# Patient Record
Sex: FEMALE | Race: WHITE | NOT HISPANIC OR LATINO | Employment: PART TIME | URBAN - METROPOLITAN AREA
[De-identification: names, ages, dates, MRNs, and addresses within clinical notes are randomized per-mention and may not be internally consistent; named-entity substitution may affect disease eponyms.]

---

## 2023-02-06 ENCOUNTER — APPOINTMENT (OUTPATIENT)
Dept: RADIOLOGY | Facility: CLINIC | Age: 73
End: 2023-02-06

## 2023-02-06 ENCOUNTER — OFFICE VISIT (OUTPATIENT)
Dept: URGENT CARE | Facility: CLINIC | Age: 73
End: 2023-02-06

## 2023-02-06 VITALS
HEART RATE: 77 BPM | HEIGHT: 66 IN | SYSTOLIC BLOOD PRESSURE: 122 MMHG | RESPIRATION RATE: 18 BRPM | TEMPERATURE: 99.2 F | BODY MASS INDEX: 24.91 KG/M2 | OXYGEN SATURATION: 99 % | DIASTOLIC BLOOD PRESSURE: 72 MMHG | WEIGHT: 155 LBS
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
## 2023-02-06 DIAGNOSIS — S61.452A DOG BITE OF LEFT HAND, INITIAL ENCOUNTER: ICD-10-CM
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
## 2023-02-06 DIAGNOSIS — S61.452A DOG BITE OF LEFT HAND, INITIAL ENCOUNTER: Primary | ICD-10-CM

## 2023-02-06 RX ORDER — VALACYCLOVIR HYDROCHLORIDE 1 G/1
TABLET, FILM COATED ORAL
COMMUNITY
Start: 2023-01-28

## 2023-02-06 RX ORDER — NIRMATRELVIR AND RITONAVIR 300-100 MG
KIT ORAL
COMMUNITY
Start: 2023-01-07

## 2023-02-06 RX ORDER — OSELTAMIVIR PHOSPHATE 75 MG/1
CAPSULE ORAL
COMMUNITY
Start: 2022-11-18

## 2023-02-06 RX ORDER — MECLIZINE HCL 12.5 MG/1
12.5 TABLET ORAL EVERY 8 HOURS PRN
COMMUNITY

## 2023-02-06 RX ORDER — GABAPENTIN 300 MG/1
300 CAPSULE ORAL 2 TIMES DAILY
COMMUNITY
Start: 2023-01-22

## 2023-02-06 RX ORDER — CELECOXIB 100 MG/1
CAPSULE ORAL
COMMUNITY
Start: 2023-01-21

## 2023-02-06 RX ORDER — AMLODIPINE BESYLATE 5 MG/1
5 TABLET ORAL DAILY
COMMUNITY
Start: 2023-01-21

## 2023-02-06 RX ORDER — AMOXICILLIN AND CLAVULANATE POTASSIUM 875; 125 MG/1; MG/1
1 TABLET, FILM COATED ORAL EVERY 12 HOURS SCHEDULED
Qty: 14 TABLET | Refills: 0 | Status: SHIPPED | OUTPATIENT
Start: 2023-02-06 | End: 2023-02-13

## 2023-02-06 NOTE — PROGRESS NOTES
[facility]        NAME: Reid Carbajal is a 67 y o  female  : 1950    MRN: [id number]  DATE: 2023  TIME: 4:34 PM    Assessment and Plan   Dog bite of left hand, initial encounter [S61 452A, W54  0XXA]  1  Dog bite of left hand, initial encounter  XR hand 3+ vw left    amoxicillin-clavulanate (AUGMENTIN) 875-125 mg per tablet    TDAP VACCINE GREATER THAN OR EQUAL TO 6YO IM            Patient Instructions     Take antibiotic as directed until completed  Motrin and/or tylenol as needed for pain  Wound care as directed  Follow up with PCP in 3-5 days  Proceed to  ER if symptoms worsen  Chief Complaint     Chief Complaint   Patient presents with   • Dog Bite     Pt reports a dog bite on her left hand that happened yesterday  Reports a neighbor's dog bit her left hand when she was breaking up her dog and the other dog  Neighbor reports dog's vaccines are UTD  Last tetanus unknown  History of Present Illness       60-year-old female presents with dog bite wound to the left hand  Patient reports her dog and neighbors dog got into a fight and was tried to break them up and got bitten on her left hand  Happened yesterday  Not sure of last tetanus  Patient reports multiple puncture wounds to left hand  Today had increased swelling pain and redness to the left hand and wrist   Denies any fevers or chills  No numbness or tingling to the left hand  Injury  The incident occurred 12 to 24 hours ago  The incident occurred at home  The injury mechanism was a cut/puncture wound  Context: Dog bite wound  No protective equipment was used  There is an injury to the left hand  The pain is moderate  It is unlikely that a foreign body is present  Pertinent negatives include no coughing, difficulty breathing, fussiness, loss of consciousness, numbness, seizures, tingling or vomiting  There have been no prior injuries to these areas  Her tetanus status is unknown         Review of Systems Review of Systems   Constitutional: Negative  Respiratory: Negative  Negative for cough  Cardiovascular: Negative  Gastrointestinal: Negative  Negative for vomiting  Musculoskeletal: Negative  Skin: Positive for wound  Neurological: Negative  Negative for tingling, seizures, loss of consciousness and numbness  Current Medications       Current Outpatient Medications:   •  amLODIPine (NORVASC) 5 mg tablet, Take 5 mg by mouth daily, Disp: , Rfl:   •  amoxicillin-clavulanate (AUGMENTIN) 875-125 mg per tablet, Take 1 tablet by mouth every 12 (twelve) hours for 7 days, Disp: 14 tablet, Rfl: 0  •  celecoxib (CeleBREX) 100 mg capsule, TAKE 1 CAPSULE BY MOUTH TWICE DAILY AS NEEDED FOR PAIN, Disp: , Rfl:   •  gabapentin (NEURONTIN) 300 mg capsule, Take 300 mg by mouth 2 (two) times a day, Disp: , Rfl:   •  sertraline (ZOLOFT) 50 mg tablet, Take 50 mg by mouth daily, Disp: , Rfl:   •  meclizine (ANTIVERT) 12 5 MG tablet, Take 12 5 mg by mouth every 8 (eight) hours as needed (Patient not taking: Reported on 2/6/2023), Disp: , Rfl:   •  oseltamivir (TAMIFLU) 75 mg capsule, TAKE 1 CAPSULE BY MOUTH TWICE DAILY FOR 5 DAYS (Patient not taking: Reported on 2/6/2023), Disp: , Rfl:   •  Paxlovid, 300/100, tablet therapy pack, , Disp: , Rfl:   •  valACYclovir (VALTREX) 1,000 mg tablet, TAKE 1 TABLET BY MOUTH THREE TIMES DAILY FOR 7 DAYS - AT ONSET OF SHINGLES RASH (Patient not taking: Reported on 2/6/2023), Disp: , Rfl:     Current Allergies     Allergies as of 02/06/2023   • (No Known Allergies)            The following portions of the patient's history were reviewed and updated as appropriate: allergies, current medications, past family history, past medical history, past social history, past surgical history and problem list      History reviewed  No pertinent past medical history  History reviewed  No pertinent surgical history  History reviewed  No pertinent family history        Medications have been verified  Objective   /72   Pulse 77   Temp 99 2 °F (37 3 °C)   Resp 18   Ht 5' 6" (1 676 m)   Wt 70 3 kg (155 lb)   SpO2 99%   BMI 25 02 kg/m²   No LMP recorded  Physical Exam     Physical Exam  Vitals and nursing note reviewed  Constitutional:       General: She is not in acute distress  Appearance: She is well-developed  HENT:      Head: Normocephalic and atraumatic  Right Ear: External ear normal       Left Ear: External ear normal       Nose: Nose normal       Mouth/Throat:      Pharynx: No oropharyngeal exudate  Eyes:      General:         Right eye: No discharge  Left eye: No discharge  Conjunctiva/sclera: Conjunctivae normal    Pulmonary:      Effort: Pulmonary effort is normal  No respiratory distress  Musculoskeletal:         General: Normal range of motion  Cervical back: Normal range of motion and neck supple  Skin:     General: Skin is warm and dry  Findings: Erythema (Small amount of erythema noted on volar aspect of wrist) and wound (Small puncture wounds noted to dorsal aspect of left hand ) present  Neurological:      Mental Status: She is alert and oriented to person, place, and time  X-rays reviewed  No fractures or abnormalities noted

## 2023-02-06 NOTE — PATIENT INSTRUCTIONS
Take antibiotic as directed until completed  Motrin and/or tylenol as needed for pain  Wound care as directed  Follow up with PCP in 3-5 days  Proceed to  ER if symptoms worsen  Animal Bite   AMBULATORY CARE:   Animal bite  injuries range from shallow cuts to deep, life-threatening wounds  Animal bites occur more often on the hands, arms, legs, and face  Bites from dogs and cats are the most common injuries  Common symptoms include the following:   Cut or punctured skin     Skin torn from your body    Swollen or bruised skin, even if the skin is not broken    Seek care immediately if:   You have a fever  Your wound is red, swollen, and draining pus  You see red streaks on the skin around the wound  You can no longer move the bitten area  Your heartbeat and breathing are much faster than usual     You feel dizzy and confused  Contact your healthcare provider if:   Your pain does not get better, even after you take pain medicine  You have nightmares or flashbacks about the animal bite  You have questions or concerns about your condition or care  Treatment for an animal bite  may include any of the following:  Irrigation and debridement  may be needed to clean out your wound  Dead, damaged, or infected tissue may be cut away to help your wound heal     Medicines:      Antibiotics  prevent or treat a bacterial infection  Prescription pain medicine  may be given  Ask how to take this medicine safely  A tetanus vaccine  may be needed to prevent tetanus  Tetanus is a life-threatening bacterial infection that affects the nerves and muscles  The bacteria can be spread through animal bites  A rabies vaccine  may be needed to prevent rabies  Rabies is a life-threatening viral infection  The virus can be spread through animal bites  Stitches  may be needed if your wound is large and not infected  Surgery  may be needed to repair deep injuries or severe wounds      Manage your symptoms:   Apply antibiotic ointment as directed  This helps prevent infection in minor skin wounds  Antibiotic ointment is available without a prescription  Keep the wound clean and covered  Wash the wound every day with soap and water or germ-killing cleanser  Ask what kinds of bandages to use  Apply ice to your wound  Ice helps prevent tissue damage and decreases swelling and pain  Use an ice pack, or put crushed ice in a plastic bag  Cover it with a towel  Apply ice on your wound for 15 to 20 minutes every hour or as directed  Elevate your wound  Raise your wound above the level of your heart as often as you can  This will help decrease swelling and pain  Prop your wound on pillows or blankets to keep it elevated comfortably  Prevent another animal bite:   Learn to recognize the signs of a scared pet  Avoid quick, sudden movements  Do not step between animals that are fighting  Do not leave a pet alone with a young child  Do not disturb an animal while it eats, sleeps, or cares for its young  Do not approach an animal you do not know, especially one that is tied up or caged  Stay away from animals that seem sick or act strangely  Do not feed or capture wild animals  Follow up with your doctor as directed:  Write down your questions so you remember to ask them during your visits  © [employer] 2022 Information is for End User's use only and may not be sold, redistributed or otherwise used for commercial purposes  All illustrations and images included in CareNotes® are the copyrighted property of A D A M [employer] Inc  or Parth Rojas   The above information is an  only  It is not intended as medical advice for individual conditions or treatments  Talk to your doctor, nurse or pharmacist before following any medical regimen to see if it is safe and effective for you

## 2023-03-04 ENCOUNTER — OFFICE VISIT (OUTPATIENT)
Dept: URGENT CARE | Facility: CLINIC | Age: 73
End: 2023-03-04

## 2023-03-04 ENCOUNTER — APPOINTMENT (OUTPATIENT)
Dept: RADIOLOGY | Facility: CLINIC | Age: 73
End: 2023-03-04

## 2023-03-04 VITALS
BODY MASS INDEX: 24.91 KG/M2 | SYSTOLIC BLOOD PRESSURE: 156 MMHG | DIASTOLIC BLOOD PRESSURE: 74 MMHG | OXYGEN SATURATION: 97 % | HEIGHT: 66 IN | HEART RATE: 73 BPM | WEIGHT: 155 LBS | TEMPERATURE: 98.8 F | RESPIRATION RATE: 20 BRPM

## 2023-03-04 DIAGNOSIS — S93.491A SPRAIN OF POSTERIOR TALOFIBULAR LIGAMENT OF RIGHT ANKLE, INITIAL ENCOUNTER: ICD-10-CM

## 2023-03-04 DIAGNOSIS — S93.491A SPRAIN OF POSTERIOR TALOFIBULAR LIGAMENT OF RIGHT ANKLE, INITIAL ENCOUNTER: Primary | ICD-10-CM

## 2023-03-04 DIAGNOSIS — H61.21 IMPACTED CERUMEN, RIGHT EAR: ICD-10-CM

## 2023-03-04 NOTE — PROGRESS NOTES
[facility]        NAME: Keyla Altman is a 67 y o  female  : 1950    MRN: [id number]  DATE: 2023  TIME: 1:48 PM    Assessment and Orders   Sprain of posterior talofibular ligament of right ankle, initial encounter [U94 626D]  1  Sprain of posterior talofibular ligament of right ankle, initial encounter  XR ankle 3+ vw right      2  Impacted cerumen, right ear  Ear cerumen removal            Plan and Discussion      Symptoms and exam consistent with ankle sprain  Xray negative for acute osseous abnormalities  Patient fitted with ankle brace  Encouraged rest, ice and NSAIDs  Impacted cerumen of right ear cleared with irrigation  Discussed ED precautions including (but not limited to)  • Difficultly breathing or shortness of breath  • Chest pain  • Acutely worsening symptoms  Risks and benefits discussed  Patient understands and agrees with the plan  Follow up with PCP  Chief Complaint     Chief Complaint   Patient presents with   • Cough     Patient c/o decreased hearing in right ear thinks it may be full of wax and reports to have fallen last Friday tripped over sidewalk and c/o right ankle pain  History of Present Illness       Ankle Pain   The incident occurred 12 to 24 hours ago  The incident occurred at home  The injury mechanism was an inversion injury  The pain is present in the right ankle  The quality of the pain is described as aching  The pain has been constant since onset  Pertinent negatives include no inability to bear weight, loss of motion, loss of sensation, muscle weakness, numbness or tingling  She reports no foreign bodies present  The symptoms are aggravated by weight bearing, palpation and movement  Review of Systems   Review of Systems   Neurological: Negative for tingling and numbness           Current Medications       Current Outpatient Medications:   •  amLODIPine (NORVASC) 5 mg tablet, Take 5 mg by mouth daily, Disp: , Rfl:   •  celecoxib (CeleBREX) 100 mg capsule, , Disp: , Rfl:   •  gabapentin (NEURONTIN) 300 mg capsule, Take 300 mg by mouth 2 (two) times a day, Disp: , Rfl:   •  sertraline (ZOLOFT) 50 mg tablet, Take 50 mg by mouth daily, Disp: , Rfl:   •  meclizine (ANTIVERT) 12 5 MG tablet, Take 12 5 mg by mouth every 8 (eight) hours as needed (Patient not taking: Reported on 2/6/2023), Disp: , Rfl:   •  oseltamivir (TAMIFLU) 75 mg capsule, TAKE 1 CAPSULE BY MOUTH TWICE DAILY FOR 5 DAYS (Patient not taking: Reported on 2/6/2023), Disp: , Rfl:   •  Paxlovid, 300/100, tablet therapy pack, , Disp: , Rfl:   •  valACYclovir (VALTREX) 1,000 mg tablet, TAKE 1 TABLET BY MOUTH THREE TIMES DAILY FOR 7 DAYS - AT ONSET OF SHINGLES RASH (Patient not taking: Reported on 2/6/2023), Disp: , Rfl:     Current Allergies     Allergies as of 03/04/2023   • (No Known Allergies)            The following portions of the patient's history were reviewed and updated as appropriate: allergies, current medications, past family history, past medical history, past social history, past surgical history and problem list      History reviewed  No pertinent past medical history  No past surgical history on file  No family history on file  Medications have been verified  Objective   /74   Pulse 73   Temp 98 8 °F (37 1 °C) (Tympanic)   Resp 20   Ht 5' 6" (1 676 m)   Wt 70 3 kg (155 lb)   SpO2 97%   BMI 25 02 kg/m²   No LMP recorded  Physical Exam     Physical Exam  HENT:      Right Ear: There is impacted cerumen  Left Ear: Ear canal and external ear normal    Pulmonary:      Effort: No respiratory distress  Musculoskeletal:      Right ankle: No swelling, deformity, ecchymosis or lacerations  Tenderness present over the posterior TF ligament  Normal range of motion  Anterior drawer test negative  Normal pulse  Neurological:      General: No focal deficit present        Mental Status: She is alert and oriented to person, place, and time  Psychiatric:         Mood and Affect: Mood normal          Behavior: Behavior normal                Severo Alley DO     Ear cerumen removal    Date/Time: 3/4/2023 1:45 PM  Performed by: Julita Lucio DO  Authorized by: Julita Lucio DO   Universal Protocol:  Patient understanding: patient states understanding of the procedure being performed  Patient identity confirmed: verbally with patient      Patient location:  Clinic  Procedure details:     Location:  R ear    Procedure type: irrigation only      Approach:  External  Post-procedure details:     Complication:  None    Hearing quality:  Improved    Patient tolerance of procedure:   Tolerated well, no immediate complications

## 2023-05-25 ENCOUNTER — OFFICE VISIT (OUTPATIENT)
Dept: URGENT CARE | Facility: CLINIC | Age: 73
End: 2023-05-25

## 2023-05-25 ENCOUNTER — TELEPHONE (OUTPATIENT)
Dept: OBGYN CLINIC | Facility: HOSPITAL | Age: 73
End: 2023-05-25

## 2023-05-25 VITALS
OXYGEN SATURATION: 98 % | DIASTOLIC BLOOD PRESSURE: 64 MMHG | HEART RATE: 62 BPM | RESPIRATION RATE: 20 BRPM | SYSTOLIC BLOOD PRESSURE: 136 MMHG | TEMPERATURE: 98.3 F

## 2023-05-25 DIAGNOSIS — M25.532 LEFT WRIST PAIN: Primary | ICD-10-CM

## 2023-05-25 RX ORDER — METHYLPREDNISOLONE 4 MG/1
TABLET ORAL
Qty: 21 EACH | Refills: 0 | Status: ON HOLD | OUTPATIENT
Start: 2023-05-25

## 2023-05-25 NOTE — TELEPHONE ENCOUNTER
Patient is being referred to a orthopedics. Please schedule accordingly.     [address] [place] [address]   [phone]

## 2023-06-04 ENCOUNTER — APPOINTMENT (EMERGENCY)
Dept: RADIOLOGY | Facility: HOSPITAL | Age: 73
End: 2023-06-04
Payer: COMMERCIAL

## 2023-06-04 ENCOUNTER — APPOINTMENT (EMERGENCY)
Dept: CT IMAGING | Facility: HOSPITAL | Age: 73
End: 2023-06-04
Payer: COMMERCIAL

## 2023-06-04 ENCOUNTER — OFFICE VISIT (OUTPATIENT)
Dept: URGENT CARE | Facility: CLINIC | Age: 73
End: 2023-06-04
Payer: COMMERCIAL

## 2023-06-04 ENCOUNTER — HOSPITAL ENCOUNTER (EMERGENCY)
Facility: HOSPITAL | Age: 73
End: 2023-06-04
Attending: EMERGENCY MEDICINE
Payer: COMMERCIAL

## 2023-06-04 ENCOUNTER — HOSPITAL ENCOUNTER (INPATIENT)
Facility: HOSPITAL | Age: 73
LOS: 4 days | Discharge: HOME | DRG: 085 | End: 2023-06-08
Attending: SURGERY | Admitting: SURGERY
Payer: COMMERCIAL

## 2023-06-04 VITALS
DIASTOLIC BLOOD PRESSURE: 138 MMHG | RESPIRATION RATE: 20 BRPM | SYSTOLIC BLOOD PRESSURE: 190 MMHG | OXYGEN SATURATION: 98 % | TEMPERATURE: 97.6 F | HEART RATE: 74 BPM

## 2023-06-04 DIAGNOSIS — I10 ACCELERATED HYPERTENSION: ICD-10-CM

## 2023-06-04 DIAGNOSIS — S00.83XA FOREHEAD CONTUSION, INITIAL ENCOUNTER: ICD-10-CM

## 2023-06-04 DIAGNOSIS — S62.111A TRIQUETRAL CHIP FRACTURE, RIGHT, CLOSED, INITIAL ENCOUNTER: ICD-10-CM

## 2023-06-04 DIAGNOSIS — S06.5XAA ACUTE SUBDURAL HEMATOMA (HCC): Primary | ICD-10-CM
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
## 2023-06-04 DIAGNOSIS — S06.5XAA SUBDURAL HEMATOMA (HCC): Primary | ICD-10-CM

## 2023-06-04 DIAGNOSIS — S00.83XA TRAUMATIC HEMATOMA OF FOREHEAD, INITIAL ENCOUNTER: ICD-10-CM

## 2023-06-04 PROBLEM — F39 MOOD DISORDER (HCC): Status: ACTIVE | Noted: 2023-06-04

## 2023-06-04 PROBLEM — E78.5 HYPERLIPIDEMIA: Status: ACTIVE | Noted: 2023-06-04

## 2023-06-04 LAB
ABO GROUP BLD: NORMAL
ABO GROUP BLD: NORMAL
ANION GAP SERPL CALCULATED.3IONS-SCNC: 5 MMOL/L (ref 4–13)
BASOPHILS # BLD AUTO: 0.07 THOUSANDS/ÂΜL (ref 0–0.1)
BASOPHILS NFR BLD AUTO: 1 % (ref 0–1)
BLD GP AB SCN SERPL QL: NEGATIVE
BUN SERPL-MCNC: 18 MG/DL (ref 5–25)
CALCIUM SERPL-MCNC: 10 MG/DL (ref 8.4–10.2)
CHLORIDE SERPL-SCNC: 106 MMOL/L (ref 96–108)
CO2 SERPL-SCNC: 30 MMOL/L (ref 21–32)
CREAT SERPL-MCNC: 0.82 MG/DL (ref 0.6–1.3)
EOSINOPHIL # BLD AUTO: 0.1 THOUSAND/ÂΜL (ref 0–0.61)
EOSINOPHIL NFR BLD AUTO: 1 % (ref 0–6)
ERYTHROCYTE [DISTWIDTH] IN BLOOD BY AUTOMATED COUNT: 13.3 % (ref 11.6–15.1)
GFR SERPL CREATININE-BSD FRML MDRD: 71 ML/MIN/1.73SQ M
GLUCOSE SERPL-MCNC: 97 MG/DL (ref 65–140)
HCT VFR BLD AUTO: 42 % (ref 34.8–46.1)
HGB BLD-MCNC: 13.7 G/DL (ref 11.5–15.4)
IMM GRANULOCYTES # BLD AUTO: 0.07 THOUSAND/UL (ref 0–0.2)
IMM GRANULOCYTES NFR BLD AUTO: 1 % (ref 0–2)
LYMPHOCYTES # BLD AUTO: 1.55 THOUSANDS/ÂΜL (ref 0.6–4.47)
LYMPHOCYTES NFR BLD AUTO: 17 % (ref 14–44)
MCH RBC QN AUTO: 29.5 PG (ref 26.8–34.3)
MCHC RBC AUTO-ENTMCNC: 32.6 G/DL (ref 31.4–37.4)
MCV RBC AUTO: 91 FL (ref 82–98)
MONOCYTES # BLD AUTO: 0.81 THOUSAND/ÂΜL (ref 0.17–1.22)
MONOCYTES NFR BLD AUTO: 9 % (ref 4–12)
NEUTROPHILS # BLD AUTO: 6.43 THOUSANDS/ÂΜL (ref 1.85–7.62)
NEUTS SEG NFR BLD AUTO: 71 % (ref 43–75)
NRBC BLD AUTO-RTO: 0 /100 WBCS
PLATELET # BLD AUTO: 221 THOUSANDS/UL (ref 149–390)
PMV BLD AUTO: 10 FL (ref 8.9–12.7)
POTASSIUM SERPL-SCNC: 4.4 MMOL/L (ref 3.5–5.3)
RBC # BLD AUTO: 4.64 MILLION/UL (ref 3.81–5.12)
RH BLD: POSITIVE
RH BLD: POSITIVE
SODIUM SERPL-SCNC: 141 MMOL/L (ref 135–147)
SPECIMEN EXPIRATION DATE: NORMAL
WBC # BLD AUTO: 9.03 THOUSAND/UL (ref 4.31–10.16)

## 2023-06-04 PROCEDURE — 86850 RBC ANTIBODY SCREEN: CPT | Performed by: EMERGENCY MEDICINE

## 2023-06-04 PROCEDURE — 99284 EMERGENCY DEPT VISIT MOD MDM: CPT

## 2023-06-04 PROCEDURE — NC001 PR NO CHARGE: Performed by: SURGERY

## 2023-06-04 PROCEDURE — 72125 CT NECK SPINE W/O DYE: CPT

## 2023-06-04 PROCEDURE — 85025 COMPLETE CBC W/AUTO DIFF WBC: CPT | Performed by: EMERGENCY MEDICINE

## 2023-06-04 PROCEDURE — 73030 X-RAY EXAM OF SHOULDER: CPT

## 2023-06-04 PROCEDURE — 73564 X-RAY EXAM KNEE 4 OR MORE: CPT

## 2023-06-04 PROCEDURE — 93005 ELECTROCARDIOGRAM TRACING: CPT

## 2023-06-04 PROCEDURE — 80048 BASIC METABOLIC PNL TOTAL CA: CPT | Performed by: EMERGENCY MEDICINE

## 2023-06-04 PROCEDURE — 96374 THER/PROPH/DIAG INJ IV PUSH: CPT

## 2023-06-04 PROCEDURE — 99223 1ST HOSP IP/OBS HIGH 75: CPT | Performed by: SURGERY

## 2023-06-04 PROCEDURE — 36415 COLL VENOUS BLD VENIPUNCTURE: CPT | Performed by: EMERGENCY MEDICINE

## 2023-06-04 PROCEDURE — 86901 BLOOD TYPING SEROLOGIC RH(D): CPT | Performed by: EMERGENCY MEDICINE

## 2023-06-04 PROCEDURE — 70450 CT HEAD/BRAIN W/O DYE: CPT

## 2023-06-04 PROCEDURE — 86900 BLOOD TYPING SEROLOGIC ABO: CPT | Performed by: EMERGENCY MEDICINE

## 2023-06-04 PROCEDURE — 73110 X-RAY EXAM OF WRIST: CPT

## 2023-06-04 RX ORDER — OXYCODONE HYDROCHLORIDE 5 MG/1
5 TABLET ORAL EVERY 4 HOURS PRN
Status: DISCONTINUED | OUTPATIENT
Start: 2023-06-04 | End: 2023-06-08 | Stop reason: HOSPADM

## 2023-06-04 RX ORDER — GINSENG 100 MG
1 CAPSULE ORAL ONCE
Status: COMPLETED | OUTPATIENT
Start: 2023-06-04 | End: 2023-06-04

## 2023-06-04 RX ORDER — LABETALOL HYDROCHLORIDE 5 MG/ML
10 INJECTION, SOLUTION INTRAVENOUS ONCE
Status: COMPLETED | OUTPATIENT
Start: 2023-06-04 | End: 2023-06-04

## 2023-06-04 RX ORDER — ACETAMINOPHEN 325 MG/1
975 TABLET ORAL EVERY 6 HOURS SCHEDULED
Status: DISCONTINUED | OUTPATIENT
Start: 2023-06-04 | End: 2023-06-08 | Stop reason: HOSPADM

## 2023-06-04 RX ORDER — CHLORHEXIDINE GLUCONATE 0.12 MG/ML
15 RINSE ORAL EVERY 12 HOURS SCHEDULED
Status: DISCONTINUED | OUTPATIENT
Start: 2023-06-04 | End: 2023-06-08 | Stop reason: HOSPADM

## 2023-06-04 RX ORDER — LABETALOL HYDROCHLORIDE 5 MG/ML
20 INJECTION, SOLUTION INTRAVENOUS EVERY 4 HOURS PRN
Status: DISCONTINUED | OUTPATIENT
Start: 2023-06-04 | End: 2023-06-04

## 2023-06-04 RX ORDER — LEVETIRACETAM 500 MG/1
500 TABLET ORAL EVERY 12 HOURS SCHEDULED
Status: DISCONTINUED | OUTPATIENT
Start: 2023-06-04 | End: 2023-06-08 | Stop reason: HOSPADM

## 2023-06-04 RX ORDER — LABETALOL HYDROCHLORIDE 5 MG/ML
20 INJECTION, SOLUTION INTRAVENOUS EVERY 4 HOURS PRN
Status: DISCONTINUED | OUTPATIENT
Start: 2023-06-04 | End: 2023-06-08 | Stop reason: HOSPADM

## 2023-06-04 RX ORDER — GABAPENTIN 300 MG/1
300 CAPSULE ORAL 2 TIMES DAILY
Status: DISCONTINUED | OUTPATIENT
Start: 2023-06-04 | End: 2023-06-08 | Stop reason: HOSPADM

## 2023-06-04 RX ORDER — AMLODIPINE BESYLATE 5 MG/1
5 TABLET ORAL DAILY
Status: DISCONTINUED | OUTPATIENT
Start: 2023-06-04 | End: 2023-06-08 | Stop reason: HOSPADM

## 2023-06-04 RX ADMIN — LABETALOL HYDROCHLORIDE 10 MG: 5 INJECTION, SOLUTION INTRAVENOUS at 11:29

## 2023-06-04 RX ADMIN — ACETAMINOPHEN 975 MG: 325 TABLET, FILM COATED ORAL at 19:52

## 2023-06-04 RX ADMIN — BACITRACIN 1 SMALL APPLICATION: 500 OINTMENT TOPICAL at 11:46

## 2023-06-04 RX ADMIN — ACETAMINOPHEN 975 MG: 325 TABLET, FILM COATED ORAL at 23:32

## 2023-06-04 RX ADMIN — LEVETIRACETAM 500 MG: 500 TABLET, FILM COATED ORAL at 14:55

## 2023-06-04 RX ADMIN — CHLORHEXIDINE GLUCONATE 15 ML: 1.2 SOLUTION ORAL at 22:11

## 2023-06-04 RX ADMIN — AMLODIPINE BESYLATE 5 MG: 5 TABLET ORAL at 13:37

## 2023-06-04 RX ADMIN — ACETAMINOPHEN 975 MG: 325 TABLET, FILM COATED ORAL at 13:37

## 2023-06-04 RX ADMIN — GABAPENTIN 300 MG: 300 CAPSULE ORAL at 18:32

## 2023-06-04 NOTE — RESULT ENCOUNTER NOTE
Currently inpatient at [facility], results available to inpatient treatment team

## 2023-06-04 NOTE — ASSESSMENT & PLAN NOTE
- secondary to mechanical fall  - 6/4 CT head- Acute left subdural hematoma measuring up to 9 mm  3 mm left to right shift    - patient denies any antiplatelet or anticoagulant use  - 500 mg keppra BID for 7 days  - neurosurgery consult -- signed off; outpatient follow-up in two weeks  - GCS 15 with no focal deficits  - PT/OT and CM consult for disposition planning

## 2023-06-04 NOTE — TRAUMA DOCUMENTATION
"Patient reports tripping on sidewalk while walking down this morning [id number]=, patient states she needed assistance getting up but was able to drive herself  Reports \"pain all over\"  Denies LOC     "

## 2023-06-04 NOTE — EMTALA/ACUTE CARE TRANSFER
[facility] EMERGENCY DEPARTMENT  [address]  [facility] [address]  Dept: [phone]      EMTALA TRANSFER CONSENT    NAME Samantha Crain                                         1950                              MRN [id number]    I have been informed of my rights regarding examination, treatment, and transfer   by Dr Paula Medina DO    Benefits: Specialized equipment and/or services available at the receiving facility (Include comment)________________________ (neurosurgery)    Risks: Potential for delay in receiving treatment, Potential deterioration of medical condition, Loss of IV, Increased discomfort during transfer, Possible worsening of condition or death during transfer      Consent for Transfer:  I acknowledge that my medical condition has been evaluated and explained to me by the emergency department physician or other qualified medical person and/or my attending physician, who has recommended that I be transferred to the service of  Accepting Physician: Clotilde Daily at [address] Name, [address] : [facility]  The above potential benefits of such transfer, the potential risks associated with such transfer, and the probable risks of not being transferred have been explained to me, and I fully understand them  The doctor has explained that, in my case, the benefits of transfer outweigh the risks  I agree to be transferred  I authorize the performance of emergency medical procedures and treatments upon me in both transit and upon arrival at the receiving facility  Additionally, I authorize the release of any and all medical records to the receiving facility and request they be transported with me, if possible  I understand that the safest mode of transportation during a medical emergency is an ambulance and that the Hospital advocates the use of this mode of transport   Risks of traveling to the receiving facility by car, including absence of medical control, life sustaining equipment, such as oxygen, and medical personnel has been explained to me and I fully understand them  (CHARITY CORRECT BOX BELOW)  [  ]  I consent to the stated transfer and to be transported by ambulance/helicopter  [  ]  I consent to the stated transfer, but refuse transportation by ambulance and accept full responsibility for my transportation by car  I understand the risks of non-ambulance transfers and I exonerate the Hospital and its staff from any deterioration in my condition that results from this refusal     X___________________________________________    DATE  23  TIME________  Signature of patient or legally responsible individual signing on patient behalf           RELATIONSHIP TO PATIENT_________________________          Provider Certification    NAME Donavan Isaacs                                        [facility] 1950                              MRN [id number]    A medical screening exam was performed on the above named patient  Based on the examination:    Condition Necessitating Transfer The primary encounter diagnosis was Acute subdural hematoma ([address] )  Diagnoses of Forehead contusion, initial encounter and Fall from standing, initial encounter were also pertinent to this visit      Patient Condition: The patient has been stabilized such that within reasonable medical probability, no material deterioration of the patient condition or the condition of the unborn child(andria) is likely to result from the transfer    Reason for Transfer: Level of Care needed not available at this facility    Transfer Requirements: Facility [facility]   · Space available and qualified personnel available for treatment as acknowledged by Marcelino Gore  · Agreed to accept transfer and to provide appropriate medical treatment as acknowledged by       Alesia Reece  · Appropriate medical records of the examination and treatment of the patient are provided at the time of transfer   [address] _______  · Transfer will be performed by qualified personnel from [facility]  and appropriate transfer equipment as required, including the use of necessary and appropriate life support measures  Provider Certification: I have examined the patient and explained the following risks and benefits of being transferred/refusing transfer to the patient/family:  General risk, such as traffic hazards, adverse weather conditions, rough terrain or turbulence, possible failure of equipment (including vehicle or aircraft), or consequences of actions of persons outside the control of the transport personnel, Unanticipated needs of medical equipment and personnel during transport, Risk of worsening condition      Based on these reasonable risks and benefits to the patient and/or the unborn child(andria), and based upon the information available at the time of the patient’s examination, I certify that the medical benefits reasonably to be expected from the provision of appropriate medical treatments at another medical facility outweigh the increasing risks, if any, to the individual’s medical condition, and in the case of labor to the unborn child, from effecting the transfer      X____________________________________________ DATE 06/04/23        TIME_______      ORIGINAL - SEND TO MEDICAL RECORDS   COPY - SEND WITH PATIENT DURING TRANSFER

## 2023-06-04 NOTE — ED PROVIDER NOTES
Emergency Department Trauma Note  Carina Saeed 67 y o  female MRN: [id number]  Unit/Bed#: ED 08/ED 08 Encounter: [id number]      Trauma Alert: Trauma Acuity: Trauma Evaluation  Model of Arrival: Mode of Arrival: Direct from scene via    Trauma Team: Current Providers  Attending Provider: Maryse Castaeñda DO  Registered Nurse: Taylor Abraham, RN  Registered Nurse: Hernando Moody RN  Consultants:     None      History of Present Illness     Chief Complaint: No chief complaint on file  HPI:  Carina Saeed is a 67 y o  female who presents with forehead contusion and abrasion  Mechanism:Details of Incident: pt reports tripping while walking on sidewalk falling forward hitting head on sidewalk Injury Date: 06/04/23 Injury Time: 0730 Injury Occurence Location - [address] [place] [address] Way: [facility]    67-year-old female with history of hypertension, lipidemia, TIA, anxiety and depression states that she tripped on uneven sidewalk around 8:00 this morning and fell forward  She struck her left forehead  Also complains of mild pain of left shoulder and pain of right wrist and left knee  Denies loss of consciousness but she is unsure  Has mild headache, mild neck discomfort but no focal neurologic changes  No other complaints  Tetanus is up-to-date  Review of Systems   Constitutional: Negative for fever  Eyes: Negative for visual disturbance  Respiratory: Negative for shortness of breath  Cardiovascular: Negative for chest pain, palpitations and leg swelling  Gastrointestinal: Negative for abdominal pain  Musculoskeletal: Positive for arthralgias and myalgias  Neurological: Positive for headaches  Negative for dizziness, seizures, syncope, light-headedness and numbness         Historical Information     Immunizations:   Immunization History   Administered Date(s) Administered   • Tdap 02/06/2023       Past Medical History:   Diagnosis Date   • Hypertension        Family History   Problem Relation Age of Onset   • Heart disease Mother      Past Surgical History:   Procedure Laterality Date   • APPENDECTOMY     • HYSTERECTOMY       Social History     Tobacco Use   • Smoking status: Never   • Smokeless tobacco: Never   Vaping Use   • Vaping Use: Never used   Substance Use Topics   • Alcohol use: Yes   • Drug use: Never     E-Cigarette/Vaping   • E-Cigarette Use Never User      E-Cigarette/Vaping Substances       Family History: non-contributory    Meds/Allergies   Prior to Admission Medications   Prescriptions Last Dose Informant Patient Reported? Taking?    Paxlovid, 300/100, tablet therapy pack   Yes No   Patient not taking: Reported on 2/6/2023   amLODIPine (NORVASC) 5 mg tablet   Yes No   Sig: Take 5 mg by mouth daily   celecoxib (CeleBREX) 100 mg capsule   Yes No   gabapentin (NEURONTIN) 300 mg capsule   Yes No   Sig: Take 300 mg by mouth 2 (two) times a day   methylPREDNISolone 4 MG tablet therapy pack Not Taking  No No   Sig: Use as directed on package   Patient not taking: Reported on 6/4/2023   sertraline (ZOLOFT) 50 mg tablet   Yes No   Sig: Take 50 mg by mouth daily   Patient not taking: Reported on 5/25/2023   valACYclovir (VALTREX) 1,000 mg tablet   Yes No   Sig: TAKE 1 TABLET BY MOUTH THREE TIMES DAILY FOR 7 DAYS - AT ONSET OF SHINGLES RASH   Patient not taking: Reported on 2/6/2023      Facility-Administered Medications: None       Allergies   Allergen Reactions   • Dust Mite Extract Other (See Comments)     And dust mites       PHYSICAL EXAM    PE limited by: nothing    Objective   Vitals:   First set: Temperature: 97 6 °F (36 4 °C) (06/04/23 0959)  Pulse: 73 (06/04/23 1002)  Respirations: 16 (06/04/23 1002)  Blood Pressure: (!) 206/61 (06/04/23 1000)  SpO2: 97 % (06/04/23 1002)    Primary Survey:   (A) Airway: normal vocalizations  (B) Breathing: symmetric air intake and chest rise  (C) Circulation: Pulses:   normal  (D) Disabliity:  GCS Total:  15  (E) Expose:  Completed    Breath sounds equal  No crepitus or chest wall tenderness with compression over the chest  No pain or instability with compression over the pelvis  No bedside imaging required  Patient is stable to proceed to CT scan  Secondary Survey: (Click on Physical Exam tab above)  Physical Exam  Vitals and nursing note reviewed  Constitutional:       General: She is not in acute distress  Appearance: She is well-developed and normal weight  She is not ill-appearing or diaphoretic  HENT:      Head: Normocephalic  Comments: Swelling, tenderness, abrasion left frontal/supraorbital  No crepitance  Right Ear: External ear normal       Left Ear: External ear normal       Nose: Nose normal       Mouth/Throat:      Mouth: Mucous membranes are moist       Pharynx: Oropharynx is clear  Eyes:      General: No scleral icterus  Conjunctiva/sclera: Conjunctivae normal       Pupils: Pupils are equal, round, and reactive to light  Cardiovascular:      Rate and Rhythm: Normal rate and regular rhythm  Pulses: Normal pulses  Heart sounds: Normal heart sounds  No murmur heard  Pulmonary:      Effort: Pulmonary effort is normal       Breath sounds: Normal breath sounds  Chest:      Chest wall: No tenderness  Abdominal:      General: Bowel sounds are normal       Palpations: Abdomen is soft  There is no mass  Tenderness: There is no abdominal tenderness  Musculoskeletal:         General: Tenderness ( left forehead, left shoulder, left kneel right wrist) and signs of injury present  Normal range of motion  Cervical back: Normal range of motion and neck supple  Tenderness ( mild, upper midline without step off) present  Skin:     General: Skin is warm and dry  Capillary Refill: Capillary refill takes less than 2 seconds  Findings: Bruising and lesion ( superficial abrasion left forehead approximately 1 cm x 0 5 cm) present  No rash  Neurological:      General: No focal deficit present        Mental Status: She is alert and oriented to person, place, and time  Mental status is at baseline  Cranial Nerves: No cranial nerve deficit  Sensory: No sensory deficit  Motor: No weakness  Coordination: Coordination normal       Gait: Gait normal       Deep Tendon Reflexes: Reflexes are normal and symmetric  Reflexes normal    Psychiatric:         Mood and Affect: Mood normal          Behavior: Behavior normal          Cervical spine cleared by clinical criteria?  No (imaging required)      Invasive Devices     None                 Lab Results:   Results Reviewed     Procedure Component Value Units Date/Time    Basic metabolic panel [[id number]] Collected: 06/04/23 1041    Lab Status: Final result Specimen: Blood from Arm, Left Updated: 06/04/23 1104     Sodium 141 mmol/L      Potassium 4 4 mmol/L      Chloride 106 mmol/L      CO2 30 mmol/L      ANION GAP 5 mmol/L      BUN 18 mg/dL      Creatinine 0 82 mg/dL      Glucose 97 mg/dL      Calcium 10 0 mg/dL      eGFR 71 ml/min/1 73sq m     Narrative:      [place] guidelines for Chronic Kidney Disease (CKD):   •  Stage 1 with normal or high GFR (GFR > 90 mL/min/1 73 square meters)  •  Stage 2 Mild CKD (GFR = 60-89 mL/min/1 73 square meters)  •  Stage 3A Moderate CKD (GFR = 45-59 mL/min/1 73 square meters)  •  Stage 3B Moderate CKD (GFR = 30-44 mL/min/1 73 square meters)  •  Stage 4 Severe CKD (GFR = 15-29 mL/min/1 73 square meters)  •  Stage 5 End Stage CKD (GFR <15 mL/min/1 73 square meters)  Note: GFR calculation is accurate only with a steady state creatinine    CBC and differential [[id number]] Collected: 06/04/23 1041    Lab Status: Final result Specimen: Blood from Arm, Left Updated: 06/04/23 1048     WBC 9 03 Thousand/uL      RBC 4 64 Million/uL      Hemoglobin 13 7 g/dL      Hematocrit 42 0 %      MCV 91 fL      MCH 29 5 pg      MCHC 32 6 g/dL      RDW 13 3 %      MPV 10 0 fL      Platelets 517 Thousands/uL      nRBC 0 /100 "WBCs      Neutrophils Relative 71 %      Immat GRANS % 1 %      Lymphocytes Relative 17 %      Monocytes Relative 9 %      Eosinophils Relative 1 %      Basophils Relative 1 %      Neutrophils Absolute 6 43 Thousands/µL      Immature Grans Absolute 0 07 Thousand/uL      Lymphocytes Absolute 1 55 Thousands/µL      Monocytes Absolute 0 81 Thousand/µL      Eosinophils Absolute 0 10 Thousand/µL      Basophils Absolute 0 07 Thousands/µL                  Imaging Studies:   Direct to CT: No  XR wrist 3+ views RIGHT   ED Interpretation by Dee Dee Uribe DO (06/04 1135)   NO acute abnormality  OA      Final Result by Satish Lima MD (06/04 1285)      Bony fragment along the dorsal of the carpus on the lateral radiograph is suspicious for subtle triquetral fracture  Alternatively, in this patient with chondrocalcinosis, this could simply represent a fragment of calcified cartilage  Orthopedic clinical    evaluation at follow-up will determine the need for follow-up cross-sectional imaging to distinguish between these 2 possibilities  This examination was marked \"immediate notification\" in Epic in order to begin the standard process by which the radiology reading room liaison alerts the referring practitioner  Workstation performed: [id number]         XR shoulder 2+ views LEFT   ED Interpretation by Dee Dee Uribe DO (06/04 1135)   No acute abnormality      Final Result by Satish Lima MD (06/04 4046)      No acute osseous abnormality  Workstation performed: [id number]         XR knee 4+ views left injury   ED Interpretation by Dee Dee Uribe DO (06/04 1135)   No acute abnormality      Final Result by Satish Lima MD (06/04 2746)      No acute osseous abnormality  Degenerative changes and meniscal chondrocalcinosis as described  Workstation performed: [id number]         TRAUMA - CT head wo contrast   Final Result by INDIA Dale MD (06/04 1046)   Acute " left subdural hematoma measuring up to 9 mm  3 mm left to right shift  I personally discussed this study with Robert Figueroa on 10:39 a m  Workstation performed: [id number]         TRAUMA - CT spine cervical wo contrast   Final Result by INDIA Chew MD (06/04 1046)      No cervical spine fracture or traumatic malalignment  Workstation performed: [id number]               Procedures  ECG 12 Lead Documentation Only    Date/Time: 6/4/2023 10:13 AM    Performed by: Karin Mclaughlin DO  Authorized by: Karin Mclaughlin DO    ECG reviewed by me, the ED Provider: yes    Patient location:  ED  Previous ECG:     Previous ECG:  Unavailable    Comparison to cardiac monitor: Yes    Interpretation:     Interpretation: normal               ED Course           Medical Decision Making  69-year-old female on celecoxib and without known bleeding diathesis tripped and fell while walking on the sidewalk today  States she has fallen several times in the past   She struck her forehead and has some discomfort of right wrist, left shoulder and left knee  Does not believe that she passed out, denies neurologic symptoms, back pain, pain to ribs or abdomen etc   Concern for facial contusion, frontal bone and periorbital fracture, intracranial hemorrhage, cervical fracture or sprain, fracture right wrist, fracture left knee  No evidence for seizure, ACS or acute neurologic deficit  Will image head and C-spine  We will do imaging of other tender peripheral joints  CT does reveal subdural hematoma with 3 mm shift  Plain imaging does not reveal new acute osseous abnormalities but patient does have 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  Discussed with trauma surgeon  Labetalol given for elevated blood pressure  Patient stable for transport      Accelerated hypertension: chronic illness or injury with exacerbation, progression, or side effects of treatment  Acute subdural hematoma [facility]): acute illness or injury that poses a threat to life or bodily functions  Fall from standing, initial encounter: acute illness or injury  Forehead contusion, initial encounter: acute illness or injury  Amount and/or Complexity of Data Reviewed  Labs: ordered  Radiology: ordered and independent interpretation performed  ECG/medicine tests: ordered and independent interpretation performed  Decision-making details documented in ED Course  Discussion of management or test interpretation with external provider(s): Discussed with trauma surgeon, Dr Clotilde Daily  He accepts patient transfer to [facility]  OTC drugs  Prescription drug management  Decision regarding hospitalization  Disposition  [facility] Transfer: No  Final diagnoses:   Acute subdural hematoma ([facility] [address] )   Forehead contusion, initial encounter   Fall from standing, initial encounter   Accelerated hypertension     Time reflects when diagnosis was documented in both MDM as applicable and the Disposition within this note     Time User Action Codes Description Comment    6/4/2023 10:53 AM Jaylin Sit Add [S06  5XAA] Acute subdural hematoma ([address] )     6/4/2023 10:53 AM Jaylin Sit Add [S00 83XA] Forehead contusion, initial encounter     6/4/2023 10:53 AM Jaylin Sit Add [L62  XXXA] Fall from standing, initial encounter     6/4/2023 11:41 AM Jaylin Sit Add [I10] Accelerated hypertension       ED Disposition     ED Disposition   Transfer to Another Facility-In Network    Condition   --    Date/Time   Sun Jun 4, 2023 10:52 AM    Comment   Samantha Crain should be transferred out to B             MD Documentation    [place]ai [place] Most Recent Value   Patient Condition The patient has been stabilized such that within reasonable medical probability, no material deterioration of the patient condition or the condition of the unborn child(andria) is likely to result from the transfer   Reason for Transfer Level of Care needed not available at this facility   Benefits of Transfer Specialized equipment and/or services available at the receiving facility (Include comment)________________________  [neurosurgery]   Risks of Transfer Potential for delay in receiving treatment, Potential deterioration of medical condition, Loss of IV, Increased discomfort during transfer, Possible worsening of condition or death during transfer   Accepting Physician [address], [address] [place]    (Name & Tel number) Samy Morris   Transported by [employer] and Unit #) Melissa Lock MD [facility]   Provider Certification General risk, such as traffic hazards, adverse weather conditions, rough terrain or turbulence, possible failure of equipment (including vehicle or aircraft), or consequences of actions of persons outside the control of the transport personnel, Unanticipated needs of medical equipment and personnel during transport, Risk of worsening condition      RN Documentation    Flowsheet Row Most [facility] Name, [address]  [place]    (Name & Tel number) Samy Morris   Transported by [employer] and Unit #) RAJANI      Follow-up Information    None       Discharge Medication List as of 6/4/2023 11:59 AM      CONTINUE these medications which have NOT CHANGED    Details   amLODIPine (NORVASC) 5 mg tablet Take 5 mg by mouth daily, Starting Sat 1/21/2023, Historical Med      celecoxib (CeleBREX) 100 mg capsule Historical Med      gabapentin (NEURONTIN) 300 mg capsule Take 300 mg by mouth 2 (two) times a day, Starting Sun 1/22/2023, Historical Med      methylPREDNISolone 4 MG tablet therapy pack Use as directed on package, Normal      Paxlovid, 300/100, tablet therapy pack Starting Sat 1/7/2023, Historical Med      sertraline (ZOLOFT) 50 mg tablet Take 50 mg by mouth daily, Starting Sat 1/21/2023, Historical Med      valACYclovir (VALTREX) 1,000 mg tablet TAKE 1 TABLET BY MOUTH THREE TIMES DAILY FOR 7 DAYS - AT ONSET OF SHINGLES RASH, Historical Med           No discharge procedures on file      PDMP Review     None          ED Provider  Electronically Signed by         Chrissy Alba DO  06/05/23 7675

## 2023-06-04 NOTE — H&P
H&P - Trauma   Monet Campos 67 y o  female MRN: [id number]  Unit/Bed#: ED 18 Encounter: [id number]    Trauma Alert: Other transfer from [id number] [place], [place] [address]way: Ambulance    Trauma Team: Attending Mar Watts and Residents Eduin Peters  Consultants:     Neurosurgery: routine consult; Epic consult order placed; Assessment/Plan   Active Problems / Assessment:   - Fall  - Left subdural  - Left forehead hematoma and abrasion     Plan:   - Admit to SD[facility] for frequent neuro checks  - repeat CT head in the AM  - 500 mg keppra BID  - neurosurgery consult  - follow formal radiology reads  - local wound care for forehead abrasion; tetanus is up-to-date  - PT/OT and CM consult for disposition planning  - defer DVT ppx with acute bleeding    History of Present Illness     Chief Complaint: fall  Mechanism:Fall     HPI:    Monet Campos is a 67 y o  female who presents after a fall  She was walking her dog when she tripped over the uneven sidewalk and fell forward onto her head and right wrist  She was able to ambulate after when someone nearby helped her up  She was reporting left forehead pain, neck pain, and right wrist pain  At [address], she was found to have a 9 mm left subdural with 3 mm midline shift  Patient was and remains GCS 15  She does endorse recent falls, but no other symptoms  She is denying numbness, tingling, muscle weakness, or change in speech  Review of Systems   All other systems reviewed and are negative  12-point, complete review of systems was reviewed and negative except as stated above  Historical Information     PMH: HTN, mood disorder, HLD  PSH: none      Social History     Tobacco Use   • Smoking status: Never   • Smokeless tobacco: Never   Substance Use Topics   • Alcohol use: Yes     Immunization History   Administered Date(s) Administered   • Tdap 02/06/2023     Last Tetanus: 2/6/23  Family History: Non-contributory    1   Before the illness or injury that brought you to the Emergency, did you need someone to help you on a regular basis? 0=No   2  Since the illness or injury that brought you to the Emergency, have you needed more help than usual to take care of yourself? 1=Yes   3  Have you been hospitalized for one or more nights during the past 6 months (excluding a stay in the Emergency Department)? 0=No   4  In general, do you see well? 0=Yes   5  In general, do you have serious problems with your memory? 0=No   6  Do you take more than three different medications everyday? 1=Yes   TOTAL   2     Did you order a geriatric consult if the score was 2 or greater?: yes     Meds/Allergies   PTA meds:   Prior to Admission Medications   Prescriptions Last Dose Informant Patient Reported? Taking? Paxlovid, 300/100, tablet therapy pack   Yes No   Patient not taking: Reported on 2/6/2023   amLODIPine (NORVASC) 5 mg tablet   Yes No   Sig: Take 5 mg by mouth daily   celecoxib (CeleBREX) 100 mg capsule   Yes No   gabapentin (NEURONTIN) 300 mg capsule   Yes No   Sig: Take 300 mg by mouth 2 (two) times a day   methylPREDNISolone 4 MG tablet therapy pack   No No   Sig: Use as directed on package   Patient not taking: Reported on 6/4/2023   sertraline (ZOLOFT) 50 mg tablet   Yes No   Sig: Take 50 mg by mouth daily   Patient not taking: Reported on 5/25/2023   valACYclovir (VALTREX) 1,000 mg tablet   Yes No   Sig: TAKE 1 TABLET BY MOUTH THREE TIMES DAILY FOR 7 DAYS - AT ONSET OF SHINGLES RASH   Patient not taking: Reported on 2/6/2023      Facility-Administered Medications: None     Allergies have not been reviewed;     Allergies   Allergen Reactions   • Dust Mite Extract Other (See Comments)     And dust mites       Objective   Initial Vitals:   Temperature: 97 6 °F (36 4 °C) (06/04/23 1230)  Pulse: 74 (06/04/23 1230)  Respirations: 16 (06/04/23 1230)  Blood Pressure: (!) 200/95 (06/04/23 1230)    Primary Survey:   Airway:        Status: patent;        Pre-hospital Interventions: none        Hospital Interventions: none  Breathing:        Pre-hospital Interventions: none       Effort: normal       Right breath sounds: normal       Left breath sounds: normal  Circulation:        Rhythm: regular       Rate: regular   Right Pulses Left Pulses    R radial: 2+    R pedal: 2+     L radial: 2+    L pedal: 2+       Disability:        GCS: Eye: 4; Verbal: 5 Motor: 6 Total: 15       Right Pupil: 2 mm;  round;  reactive         Left Pupil:  2 mm;  round;  reactive      R Motor Strength L Motor Strength    R : 5/5  R dorsiflex: 5/5  R plantarflex: 5/5 L : 5/5  L dorsiflex: 5/5  L plantarflex: 5/5        Sensory:  No sensory deficit  Exposure:       Completed: Yes      Secondary Survey:  Physical Exam  Vitals and nursing note reviewed  Constitutional:       Appearance: Normal appearance  HENT:      Head: Normocephalic  Right Ear: External ear normal       Left Ear: External ear normal       Nose: Nose normal       Mouth/Throat:      Mouth: Mucous membranes are moist       Pharynx: Oropharynx is clear  Eyes:      Extraocular Movements: Extraocular movements intact  Pupils: Pupils are equal, round, and reactive to light  Cardiovascular:      Rate and Rhythm: Normal rate and regular rhythm  Pulses: Normal pulses  Pulmonary:      Effort: Pulmonary effort is normal       Breath sounds: Normal breath sounds  Abdominal:      General: Abdomen is flat  There is no distension  Palpations: Abdomen is soft  Tenderness: There is no abdominal tenderness  Musculoskeletal:         General: No swelling or tenderness  Normal range of motion  Cervical back: Normal range of motion and neck supple  No rigidity or tenderness  Right lower leg: No edema  Left lower leg: No edema  Skin:     General: Skin is warm and dry  Capillary Refill: Capillary refill takes less than 2 seconds  Neurological:      General: No focal deficit present        Mental Status: She is alert and oriented to person, place, and time  Psychiatric:         Mood and Affect: Mood normal          Behavior: Behavior normal          Invasive Devices     Peripheral Intravenous Line  Duration           Peripheral IV 06/04/23 Left Antecubital <1 day              Lab Results:   Results: I have personally reviewed all pertinent laboratory/tests results, BMP/CMP:   Lab Results   Component Value Date    BUN 18 06/04/2023    CALCIUM 10 0 06/04/2023     06/04/2023    CO2 30 06/04/2023    CREATININE 0 82 06/04/2023    EGFR 71 06/04/2023    K 4 4 06/04/2023    SODIUM 141 06/04/2023    and CBC:   Lab Results   Component Value Date    HCT 42 0 06/04/2023    HGB 13 7 06/04/2023    MCH 29 5 06/04/2023    MCHC 32 6 06/04/2023    MCV 91 06/04/2023    MPV 10 0 06/04/2023    NRBC 0 06/04/2023     06/04/2023    RBC 4 64 06/04/2023    RDW 13 3 06/04/2023    WBC 9 03 06/04/2023       Imaging Results: I have personally reviewed pertinent reports  Chest Xray(s): N/A   FAST exam(s): N/A   CT Scan(s): positive for acute findings: left subdural hematoma with 3 mm shift   Additional Xray(s): pending     Other Studies: n/a    Code Status: Level 1 - Full Code  Advance Directive and Living Will:      Power of :    POLST:    I have spent 35 minutes with Patient  today in which greater than 50% of this time was spent in counseling/coordination of care regarding Diagnostic results, Prognosis, Risks and benefits of tx options, Instructions for management, Patient and family education, Importance of tx compliance, Risk factor reductions, Impressions, Counseling / Coordination of care, Documenting in the medical record, Reviewing / ordering tests, medicine, procedures  , Obtaining or reviewing history   and Communicating with other healthcare professionals

## 2023-06-04 NOTE — TREATMENT PLAN
e-Consult (IPC)       Contacted by Adalid Ypeez via TT at [address] [facility], [address] 67 y o  female MRN: [id number]  Unit/Bed#: ED 25 Encounter: [id number]    Reason for Consult    Per provider report, patient presented after she was walking her dog and tripped over uneven sidewalk falling forward onto her head and right wrist   No reported blood thinners  Available past medical history,social history, surgical history, medication list, drug allergies and review of systems were reviewed  /90   Pulse 84   Temp 97 6 °F (36 4 °C) (Oral)   Resp 16   SpO2 96%      Clinical exam per provider report, GCS 15     Imaging personally reviewed  CT head demonstrates 9 mm left subdural hematoma with 3 mm left-to-right shift  Assessment and Recommendations    · Continue frequent neurological checks  · STAT CT head with any neurological decline including drop GCS of 2pts within 1 hr   · Recommend repeat CT head tomorrow  · Recommend SBP <160mmHg  · Seizure prophylaxis per trauma team  · Hold all antiplatelet and anticoagulation medications  · Medical management and pain control per primary team  · Mobilize with PT/OT  · DVT PPX: SCDs only at this time  Would recommend additional CT head for stability prior to initiation of pharmacological DVT PPX  · No neurosurgical intervention indicated at this juncture  · Neurosurgery will continue to follow, full consult will follow tomorrow, call with any further questions or concerns  All questions answered  Provider is in agreement with the course of action  11-20 minutes, >50% of the total time devoted to medical consultative verbal/EMR discussion between providers  Written report will be generated in the EMR

## 2023-06-05 ENCOUNTER — TELEPHONE (OUTPATIENT)
Dept: NEUROSURGERY | Facility: CLINIC | Age: 73
End: 2023-06-05

## 2023-06-05 ENCOUNTER — APPOINTMENT (INPATIENT)
Dept: RADIOLOGY | Facility: HOSPITAL | Age: 73
DRG: 085 | End: 2023-06-05
Payer: COMMERCIAL

## 2023-06-05 LAB
ANION GAP SERPL CALCULATED.3IONS-SCNC: -1 MMOL/L (ref 4–13)
BUN SERPL-MCNC: 21 MG/DL (ref 5–25)
CALCIUM SERPL-MCNC: 9.7 MG/DL (ref 8.3–10.1)
CHLORIDE SERPL-SCNC: 106 MMOL/L (ref 96–108)
CO2 SERPL-SCNC: 30 MMOL/L (ref 21–32)
CREAT SERPL-MCNC: 1.04 MG/DL (ref 0.6–1.3)
ERYTHROCYTE [DISTWIDTH] IN BLOOD BY AUTOMATED COUNT: 13.6 % (ref 11.6–15.1)
GFR SERPL CREATININE-BSD FRML MDRD: 53 ML/MIN/1.73SQ M
GLUCOSE SERPL-MCNC: 101 MG/DL (ref 65–140)
HCT VFR BLD AUTO: 38.5 % (ref 34.8–46.1)
HGB BLD-MCNC: 13 G/DL (ref 11.5–15.4)
MAGNESIUM SERPL-MCNC: 2.1 MG/DL (ref 1.6–2.6)
MCH RBC QN AUTO: 30.2 PG (ref 26.8–34.3)
MCHC RBC AUTO-ENTMCNC: 33.8 G/DL (ref 31.4–37.4)
MCV RBC AUTO: 89 FL (ref 82–98)
PHOSPHATE SERPL-MCNC: 3.7 MG/DL (ref 2.3–4.1)
PLATELET # BLD AUTO: 215 THOUSANDS/UL (ref 149–390)
PMV BLD AUTO: 9.9 FL (ref 8.9–12.7)
POTASSIUM SERPL-SCNC: 4.2 MMOL/L (ref 3.5–5.3)
RBC # BLD AUTO: 4.31 MILLION/UL (ref 3.81–5.12)
SODIUM SERPL-SCNC: 135 MMOL/L (ref 135–147)
WBC # BLD AUTO: 7.72 THOUSAND/UL (ref 4.31–10.16)

## 2023-06-05 PROCEDURE — 99222 1ST HOSP IP/OBS MODERATE 55: CPT | Performed by: STUDENT IN AN ORGANIZED HEALTH CARE EDUCATION/TRAINING PROGRAM

## 2023-06-05 PROCEDURE — 97163 PT EVAL HIGH COMPLEX 45 MIN: CPT

## 2023-06-05 PROCEDURE — 97166 OT EVAL MOD COMPLEX 45 MIN: CPT

## 2023-06-05 PROCEDURE — 80048 BASIC METABOLIC PNL TOTAL CA: CPT

## 2023-06-05 PROCEDURE — NC001 PR NO CHARGE: Performed by: SURGERY

## 2023-06-05 PROCEDURE — 70450 CT HEAD/BRAIN W/O DYE: CPT

## 2023-06-05 PROCEDURE — G1004 CDSM NDSC: HCPCS

## 2023-06-05 PROCEDURE — NC001 PR NO CHARGE: Performed by: STUDENT IN AN ORGANIZED HEALTH CARE EDUCATION/TRAINING PROGRAM

## 2023-06-05 PROCEDURE — 83735 ASSAY OF MAGNESIUM: CPT

## 2023-06-05 PROCEDURE — 99223 1ST HOSP IP/OBS HIGH 75: CPT | Performed by: INTERNAL MEDICINE

## 2023-06-05 PROCEDURE — 25630 CLTX CARPL FX W/O MNPJ EA B1: CPT | Performed by: STUDENT IN AN ORGANIZED HEALTH CARE EDUCATION/TRAINING PROGRAM

## 2023-06-05 PROCEDURE — 84100 ASSAY OF PHOSPHORUS: CPT

## 2023-06-05 PROCEDURE — 99223 1ST HOSP IP/OBS HIGH 75: CPT | Performed by: NURSE PRACTITIONER

## 2023-06-05 PROCEDURE — 85027 COMPLETE CBC AUTOMATED: CPT

## 2023-06-05 RX ORDER — ENOXAPARIN SODIUM 100 MG/ML
30 INJECTION SUBCUTANEOUS EVERY 12 HOURS SCHEDULED
Status: DISCONTINUED | OUTPATIENT
Start: 2023-06-05 | End: 2023-06-08 | Stop reason: HOSPADM

## 2023-06-05 RX ORDER — AMOXICILLIN 250 MG
1 CAPSULE ORAL DAILY
Status: DISCONTINUED | OUTPATIENT
Start: 2023-06-05 | End: 2023-06-08 | Stop reason: HOSPADM

## 2023-06-05 RX ADMIN — SERTRALINE 50 MG: 50 TABLET, FILM COATED ORAL at 10:42

## 2023-06-05 RX ADMIN — GABAPENTIN 300 MG: 300 CAPSULE ORAL at 09:55

## 2023-06-05 RX ADMIN — LEVETIRACETAM 500 MG: 500 TABLET, FILM COATED ORAL at 09:55

## 2023-06-05 RX ADMIN — ACETAMINOPHEN 975 MG: 325 TABLET, FILM COATED ORAL at 05:55

## 2023-06-05 RX ADMIN — ACETAMINOPHEN 975 MG: 325 TABLET, FILM COATED ORAL at 10:43

## 2023-06-05 RX ADMIN — GABAPENTIN 300 MG: 300 CAPSULE ORAL at 17:35

## 2023-06-05 RX ADMIN — SENNOSIDES AND DOCUSATE SODIUM 1 TABLET: 50; 8.6 TABLET ORAL at 10:42

## 2023-06-05 RX ADMIN — ACETAMINOPHEN 975 MG: 325 TABLET, FILM COATED ORAL at 17:34

## 2023-06-05 RX ADMIN — LEVETIRACETAM 500 MG: 500 TABLET, FILM COATED ORAL at 21:59

## 2023-06-05 RX ADMIN — ENOXAPARIN SODIUM 30 MG: 30 INJECTION SUBCUTANEOUS at 10:42

## 2023-06-05 RX ADMIN — CHLORHEXIDINE GLUCONATE 15 ML: 1.2 SOLUTION ORAL at 21:59

## 2023-06-05 RX ADMIN — CHLORHEXIDINE GLUCONATE 15 ML: 1.2 SOLUTION ORAL at 09:55

## 2023-06-05 RX ADMIN — OXYCODONE HYDROCHLORIDE 5 MG: 5 TABLET ORAL at 17:46

## 2023-06-05 RX ADMIN — ENOXAPARIN SODIUM 30 MG: 30 INJECTION SUBCUTANEOUS at 21:59

## 2023-06-05 NOTE — PLAN OF CARE
Problem: Prexisting or High Potential for Compromised Skin Integrity  Goal: Skin integrity is maintained or improved  Description: INTERVENTIONS:  - Identify patients at risk for skin breakdown  - Assess and monitor skin integrity  - Assess and monitor nutrition and hydration status  - Monitor labs   - Assess for incontinence   - Turn and reposition patient  - Assist with mobility/ambulation  - Relieve pressure over bony prominences  - Avoid friction and shearing  - Provide appropriate hygiene as needed including keeping skin clean and dry  - Evaluate need for skin moisturizer/barrier cream  - Collaborate with interdisciplinary team   - Patient/family teaching  - Consider wound care consult   Outcome: Progressing     Problem: MOBILITY - ADULT  Goal: Maintain or return to baseline ADL function  Description: INTERVENTIONS:  -  Assess patient's ability to carry out ADLs; assess patient's baseline for ADL function and identify physical deficits which impact ability to perform ADLs (bathing, care of mouth/teeth, toileting, grooming, dressing, etc )  - Assess/evaluate cause of self-care deficits   - Assess range of motion  - Assess patient's mobility; develop plan if impaired  - Assess patient's need for assistive devices and provide as appropriate  - Encourage maximum independence but intervene and supervise when necessary  - Involve family in performance of ADLs  - Assess for home care needs following discharge   - Consider OT consult to assist with ADL evaluation and planning for discharge  - Provide patient education as appropriate  Outcome: Progressing  Goal: Maintains/Returns to pre admission functional level  Description: INTERVENTIONS:  - Perform BMAT or MOVE assessment daily    - Set and communicate daily mobility goal to care team and patient/family/caregiver  - Collaborate with rehabilitation services on mobility goals if consulted  - Perform Range of Motion 3 times a day    - Reposition patient every 2 hours   - Dangle patient 3 times a day  - Stand patient 3 times a day  - Ambulate patient 3 times a day  - Out of bed to chair 3 times a day   - Out of bed for meals 3 times a day  - Out of bed for toileting  - Record patient progress and toleration of activity level   Outcome: Progressing     Problem: PAIN - ADULT  Goal: Verbalizes/displays adequate comfort level or baseline comfort level  Description: Interventions:  - Encourage patient to monitor pain and request assistance  - Assess pain using appropriate pain scale  - Administer analgesics based on type and severity of pain and evaluate response  - Implement non-pharmacological measures as appropriate and evaluate response  - Consider cultural and social influences on pain and pain management  - Notify physician/advanced practitioner if interventions unsuccessful or patient reports new pain  Outcome: Progressing     Problem: INFECTION - ADULT  Goal: Absence or prevention of progression during hospitalization  Description: INTERVENTIONS:  - Assess and monitor for signs and symptoms of infection  - Monitor lab/diagnostic results  - Monitor all insertion sites, i e  indwelling lines, tubes, and drains  - Monitor endotracheal if appropriate and nasal secretions for changes in amount and color  - [place] appropriate cooling/warming therapies per order  - Administer medications as ordered  - Instruct and encourage patient and family to use good hand hygiene technique  - Identify and instruct in appropriate isolation precautions for identified infection/condition  Outcome: Progressing  Goal: Absence of fever/infection during neutropenic period  Description: INTERVENTIONS:  - Monitor WBC    Outcome: Progressing     Problem: SAFETY ADULT  Goal: Maintain or return to baseline ADL function  Description: INTERVENTIONS:  -  Assess patient's ability to carry out ADLs; assess patient's baseline for ADL function and identify physical deficits which impact ability to perform ADLs (bathing, care of mouth/teeth, toileting, grooming, dressing, etc )  - Assess/evaluate cause of self-care deficits   - Assess range of motion  - Assess patient's mobility; develop plan if impaired  - Assess patient's need for assistive devices and provide as appropriate  - Encourage maximum independence but intervene and supervise when necessary  - Involve family in performance of ADLs  - Assess for home care needs following discharge   - Consider OT consult to assist with ADL evaluation and planning for discharge  - Provide patient education as appropriate  Outcome: Progressing  Goal: Maintains/Returns to pre admission functional level  Description: INTERVENTIONS:  - Perform BMAT or MOVE assessment daily    - Set and communicate daily mobility goal to care team and patient/family/caregiver  - Collaborate with rehabilitation services on mobility goals if consulted  - Perform Range of Motion 3 times a day  - Reposition patient every 2 hours    - Dangle patient 3 times a day  - Stand patient 3 times a day  - Ambulate patient 3 times a day  - Out of bed to chair 3 times a day   - Out of bed for meals 3 times a day  - Out of bed for toileting  - Record patient progress and toleration of activity level   Outcome: Progressing  Goal: Patient will remain free of falls  Description: INTERVENTIONS:  - Educate patient/family on patient safety including physical limitations  - Instruct patient to call for assistance with activity   - Consult OT/PT to assist with strengthening/mobility   - Keep Call bell within reach  - Keep bed low and locked with side rails adjusted as appropriate  - Keep care items and personal belongings within reach  - Initiate and maintain comfort rounds  - Make Fall Risk Sign visible to staff  - Offer Toileting every  Hours, in advance of need  - Initiate/Maintain alarm  - Obtain necessary fall risk management equipment:   - Apply yellow socks and bracelet for high fall risk patients  - Consider moving patient to room near nurses station  Outcome: Progressing     Problem: DISCHARGE PLANNING  Goal: Discharge to home or other facility with appropriate resources  Description: INTERVENTIONS:  - Identify barriers to discharge w/patient and caregiver  - Arrange for needed discharge resources and transportation as appropriate  - Identify discharge learning needs (meds, wound care, etc )  - Arrange for interpretive services to assist at discharge as needed  - Refer to Case Management Department for coordinating discharge planning if the patient needs post-hospital services based on physician/advanced practitioner order or complex needs related to functional status, cognitive ability, or social support system  Outcome: Progressing     Problem: Knowledge Deficit  Goal: Patient/family/caregiver demonstrates understanding of disease process, treatment plan, medications, and discharge instructions  Description: Complete learning assessment and assess knowledge base    Interventions:  - Provide teaching at level of understanding  - Provide teaching via preferred learning methods  Outcome: Progressing     Problem: Potential for Falls  Goal: Patient will remain free of falls  Description: INTERVENTIONS:  - Educate patient/family on patient safety including physical limitations  - Instruct patient to call for assistance with activity   - Consult OT/PT to assist with strengthening/mobility   - Keep Call bell within reach  - Keep bed low and locked with side rails adjusted as appropriate  - Keep care items and personal belongings within reach  - Initiate and maintain comfort rounds  - Make Fall Risk Sign visible to staff  - Offer Toileting every 2 Hours, in advance of need  - Initiate/Maintain bed alarm  - Obtain necessary fall risk management equipment: allevyn, bracelet  - Apply yellow socks and bracelet for high fall risk patients  - Consider moving patient to room near nurses station  Outcome: Progressing

## 2023-06-05 NOTE — PLAN OF CARE
Problem: PHYSICAL THERAPY ADULT  Goal: Performs mobility at highest level of function for planned discharge setting  See evaluation for individualized goals  Description: Treatment/Interventions: Therapeutic exercise          See flowsheet documentation for full assessment, interventions and recommendations  Note: Prognosis: Good  Problem List: Decreased strength, Pain  Assessment: Pt is a 78yo female admitted [facility] ICU following a fall which resulted in subdural hematoma, R dorsal triquetral fx, and bruising and abrasions of L side of face and L knee  Patient reports that she tripped while walking her dog, and that she hit her head on the sidewalk  PMH significant for HTN and hyperlipidemia  Pt was communicative and cooperative  Supervision required for all transfers and functional mobility due to decreased safety awareness and slight impulsivity  No major losses of balance observed during gait, however pt displayed slow gait with shortened stride length  Pt is currently in a volar slab splint on her RUE due to triquetral fx, and is thus NWB on her RUE; required several verbal cues to maintain NWB status during transfers  Patient would benefit from skilled PT intervention while in the hospital to address the aforementioned imairments, continued education regarding NWB status, and general mobility and strengthening to aid in improved functional mobility and independence when returning home  At end of session, pt was left sitting comfortably in recliner with call bell in reach and all current needs met  Barriers to Discharge: Decreased caregiver support     PT Discharge Recommendation: No rehabilitation needs (No home PT currently recommended; follow up with ortho to determine appropriate PT needs at that time)    See flowsheet documentation for full assessment

## 2023-06-05 NOTE — CONSULTS
Consultation - Geriatric Medicine   Samantha Crain 67 y o  female MRN: [id number]  Unit/Bed#: ICU 02 Encounter: [id number]      Assessment/Plan     Ambulatory dysfunction with fall  -reportedly mechanical fall 6/4/23  -(+) head strike (-) loss of consciousness  -injuries as outlined below  -Does not require use of assist device for ambulation at baseline  -no prior hx recurrent falls  -at increased risk future falls due to age, hx fall, deconditioning/debility and unfamiliar environment   -encourage good body mechanics and assist with all transfers  -keep personal items and call bell close to prevent reaching  -maintain environment free of fall hazards  -encourage appropriate footwear and adequate lighting at all times when out of bed  -PT and OT pending     SDH  -s/p fall as outlined above  -CTH 6/4 reports acute left subdural hematoma measuring 9 mm with 3 mm left-to-right shift   -CTH 6/5 reports acute left frontotemporal subdural hematoma with local mass effect similar to study from admission  -AC/AP on hold   -neurochecks per protocol  -Nsx on consult - recommend close o/p f/u    Right dorsal triquetral fracture  -NWB in splint  -Neurovascular checks per protocol  -acute pain control   -non-operative management per Ortho     Acute pain due to trauma   -recommend pain control per Geriatric pain protocol:  Tylenol 975mg Q8H scheduled  Roxicodone 2 5mg Q4H PRN moderate pain  Roxicodone 5mg Q4H PRN severe pain  Dilaudid 0 2mg Q4H PRN  -consider adjuncts such as lidocaine patch topically to appropriate areas   -encourage addition of non-pharmacologic pain treatment including ice and frequent repositioning  -recommend  bowel regimen to prevent and treat constipation due to increased risk with acute pain and opiate pain medications    Depression  -symptoms well controlled on home Zoloft regimen, continue current dosing  -consider o/p referral to counseling/support group as part of comprehensive multimodal treatment approach  -Continue psychosocial supports and cares    Impaired Vision  -recommend use of corrective lenses at all appropriate times  -encourage adequate lighting and encourage use of assistance with ambulation  -keep personal belongings close to person to avoid reaching  -encourage appropriate footwear at all times  -consider large font for printed materials provided to patient     Cognitive screening  -alert and fully oriented, denies memory or cognitive concerns   -Independent with ADLs and IADLs at baseline  -No prior cognitive testing on record for review  -CTH on admission viewed, in addition to acute L SDH, reveals at least mild chronic microangiopathic changes   -No recent TSH or B12 on record for review, consider checking with routine labs   -Encourage patient remain physically, socially, and cognitively active and engaged to maintain cognitive acuity    Deconditioning/debility/frailty  -clinical frailty scale stage IV, vulnerable   -multifactorial including age, HTN and co-morbidities now with fall and acute SDH superimposed   -encourage well balanced nutrition  -continue optimization chronic conditions and address acute derangements as arise  -continue psychosocial supports  -CM on consult, assist with information regarding local  much appreciated     Delirium precautions  -Patient is high risk of delirium due to age, fall, SDH, ICU admission   -Initiate delirium precautions  -maintain normal sleep/wake cycle  -minimize overnight interruptions, group overnight vitals/labs/nursing checks as possible  -dim lights, close blinds and turn off tv to minimize stimulation and encourage sleep environment in evenings  -ensure that pain is well controlled  -monitor for fecal and urinary retention which may precipitate delirium  -encourage early mobilization and ambulation with assistance   -provide frequent reorientation and redirection as indicated and appropriate  -minimize use of medications which may precipitate or worsen delirium such as tramadol, benzodiazepine, anticholinergics, and benadryl as possible   -encourage hydration and nutrition   -redirect unwanted behaviors as first line    Home medication review  [facility] (:    Simvastatin 40 Mg daily  Norvasc 5 Mg daily  Celebrex 100 Mg twice daily  Gabapentin 300 Mg twice daily  Methylprednisolone 4 mg - instructions per dose pack  Zoloft 50 Mg daily  Valtrex 1000 Mg TID for 7 days at onset shingles rash    Care coordination: rounded with Lashanda Moreno (RN)    History of Present Illness   Physician Requesting Consult: Cy Rivero,*  Reason for Consult / Principal Problem: Fall  Hx and PE limited by: N/A  Additional history obtained from: Chart review and patient evaluation    HPI: Rosi Stuart is a 67y o  year old female with anxiety, HLD, mood disorder, HTN, tremor, chronic headache, depression and anxiety who is admitted to the trauma service with ambulatory dysfunction and fall found to have subdural hematoma on admission imaging, she is being seen in consultation by Geriatrics for high risk developing delirium during hospitalization  Kandice Núñez is seen and examined at bedside in ICU where she is lying resting, she explains that she sustained a mechanical fall which she believes occurred when she was out walking her dog and tripped on uneven ground  She struck her left face and knee and right arm, she denied losing consciousness but was unable to get up right away due to the shock of the situation  Bystanders saw the events and were able to assist her up  She reports initially having headache and right arm pain which are both improved now  She does not utilize any assist device for ambulation at baseline and endorses history of one additional fall which was down the stairs at home which occurred when she tripped on something  She reports that prior to the fall she was in her usual state of health   At baseline she is independent with ADLs and iADLs and denies memory or cognitive concerns  She wears glasses for reading, does not use hearing aid or denture  Inpatient consult to Gerontology  Consult performed by: Danitza Tran DO  Consult ordered by: Dc Ruggiero MD        Review of Systems   Constitutional: Negative  Negative for chills and fever  HENT: Negative  Eyes: Positive for visual disturbance (wears glasses)  Respiratory: Negative  Negative for shortness of breath  Cardiovascular: Negative  Gastrointestinal: Negative  Genitourinary: Negative  Musculoskeletal: Negative  Left knee and right wrist sore since falling    Skin: Negative  Neurological: Negative  Negative for dizziness, weakness, light-headedness, numbness and headaches  Hematological: Negative  Psychiatric/Behavioral: Negative  Negative for confusion and sleep disturbance  All other systems reviewed and are negative      Historical Information   Past Medical History:   Diagnosis Date   • Hypertension      Past Surgical History:   Procedure Laterality Date   • APPENDECTOMY     • HYSTERECTOMY       Social History   Social History     Substance and Sexual Activity   Alcohol Use Yes     Social History     Substance and Sexual Activity   Drug Use Never     Social History     Tobacco Use   Smoking Status Never   Smokeless Tobacco Never     Family History:   Family History   Problem Relation Age of Onset   • Heart disease Mother      Meds/Allergies   all current active meds have been reviewed    Allergies   Allergen Reactions   • Dust Mite Extract Other (See Comments)     And dust mites     Objective     Intake/Output Summary (Last 24 hours) at 6/5/2023 1038  Last data filed at 6/5/2023 0000  Gross per 24 hour   Intake 120 ml   Output 500 ml   Net -380 ml     Invasive Devices     Peripheral Intravenous Line  Duration           Peripheral IV 06/05/23 Left;Ventral (anterior) Forearm <1 day              Physical Exam  Vitals and nursing note reviewed  Constitutional:       General: She is not in acute distress  Comments: Elderly female appears stated age    HENT:      Head: Normocephalic  Comments: Extensive left facial swelling and ecchymosis      Nose: Nose normal       Mouth/Throat:      Mouth: Mucous membranes are dry  Comments: Dentition mostly in tact  Eyes:      General: No scleral icterus  Conjunctiva/sclera: Conjunctivae normal       Comments: L eyelid swelling and ecchymosis    Neck:      Comments: Trachea midline, phonation normal  Cardiovascular:      Rate and Rhythm: Normal rate and regular rhythm  Pulmonary:      Effort: Pulmonary effort is normal  No respiratory distress  Breath sounds: No wheezing  Comments: Saturating well on room air   Abdominal:      General: There is no distension  Palpations: Abdomen is soft  Tenderness: There is no abdominal tenderness  Musculoskeletal:      Cervical back: Neck supple  Right lower leg: No edema  Left lower leg: No edema  Comments: RUE in splint and ACE wrap   Skin:     General: Skin is warm and dry  Comments: Abrasion left knee    Neurological:      Mental Status: She is alert  Comments: Awake and alert, oriented, answers questions appropriately, speech clear and fluent    Psychiatric:         Mood and Affect: Mood normal          Behavior: Behavior normal        Lab Results:     I have personally reviewed pertinent lab results  I have personally reviewed the pertinent imaging study reports in PACS      Therapies:   PT: pending   OT: pending     VTE Prophylaxis: Enoxaparin (Lovenox)    Code Status: Level 1 - Full Code  Advance Directive and Living Will:      Power of :    POLST:      Family and Social Support:   Living Arrangements: Other (Comment) (Lives with granddaughter's ex-fiance)  Support Systems: Self; Family members  Assistance Needed: n/a  Type of Current Residence: Private residence  [address] [place] [address] Services: No    Goals of Care: prevent future falls

## 2023-06-05 NOTE — DISCHARGE INSTR - AVS FIRST PAGE
Neurosurgery discharge instructions following traumatic head bleed:     Do not take any blood thinning medications (ie  No Advil  No motrin  No ibuprofen  No Aleve  No Aspirin  No fishoil  No heparin  No antiplatelet / no anticoagulation medication)  Refrain from activity that increases chance of trauma to head or falls  Recommend you take fall precaution  No strenuous activity or sports  Return to hospital Emergency Room if you experience worsening / new headache, nausea/vomiting, speech/vision change, seizure, confusion / mental status change, weakness, or other neurological changes  Follow-up as scheduled with a repeat CT head without contrast to be completed 2-3 days prior to visit  Prescription has been entered electronically  Please call  to schedule  Discharge Instructions - Orthopedics  Pawan Grimm 67 y o  female MRN: [id number]  Unit/Bed#: ICU 02    Weight Bearing Status:                                           Do not bear weight on your right hand  DVT prophylaxis  None from orthopedic perspective  Pain:  Continue analgesics as directed  Dressing Instructions:   Please keep clean, dry and intact until follow up - it is very important that you do not get your splint wet  Appt Instructions: If you do not have your appointment, please call the clinic at [phone] to schedule a 2 week follow up with Dr Sanam Khan  Otherwise followup as scheduled     Contact the office sooner if you experience any increased numbness/tingling in the extremities        Miscellaneous:  N/A          Tylenol for headaches  Do not use aspirin or ibuprofen or any over the counter medications using these  Check to make sure these are not part of over the counter combo medications          Make follow up with neurosurgery and orthopedics

## 2023-06-05 NOTE — OCCUPATIONAL THERAPY NOTE
Occupational Therapy Evaluation     Patient Name: Rosi CARR Date: 6/5/2023  Problem List  Principal Problem:    Subdural hematoma ([facility] [address] )  Active Problems:    Hyperlipidemia    Hypertension    Mood disorder ([facility] [address] )    Traumatic hematoma of forehead    Triquetral chip fracture, right, closed, initial encounter    Past Medical History  Past Medical History:   Diagnosis Date   • Hypertension      Past Surgical History  Past Surgical History:   Procedure Laterality Date   • APPENDECTOMY     • HYSTERECTOMY             06/05/23 0914   OT Last Visit   OT Visit Date 06/05/23   Note Type   Note type Evaluation   Pain Assessment   Pain Assessment Tool 0-10   Pain Score 4   Pain Location/Orientation Location: Head   Pain Onset/Description Descriptor: Pressure   Patient's Stated Pain Goal No pain   Hospital Pain Intervention(s) Repositioned; Ambulation/increased activity; Emotional support   Multiple Pain Sites Yes   Pain 2   Pain Score 2 7   Pain Location/Orientation 2 Orientation: Left; Location: Knee   Pain Onset/Description 2 Onset: Ongoing; Descriptor: Aching;Descriptor: Discomfort   Hospital Pain Intervention(s) 2 Repositioned; Ambulation/increased activity; Emotional support   Restrictions/Precautions   Weight Bearing Precautions Per Order Yes   RUE Weight Bearing Per Order (S)  NWB   Braces or Orthoses (S)  Splint  (volar slab splint)   Other Precautions Chair Alarm;WBS;Multiple lines;Telemetry; Fall Risk;Pain   Home Living   Type of Home Other (Comment)  (rents room in 2 [address] [facility])   Home Layout Two level;Bed/bath upstairs  (2 RODRIGO)   Bathroom Shower/Tub Tub/shower unit   Bathroom Toilet Standard   Bathroom Equipment Other (Comment)  (denies any)   Home Equipment Cane  (unused PTA)   Additional Comments pt reports living in 2 , rents room on 2nd floor, 2+8 steps up  Pt reports she lives w/ her granddght's ex boyfriend, and his new girlfriend  pt's granddgght no longer lives there   Reports she had to move to [facility] from [place] when she got kicked out of her home  Pt has a storage garage of stuff in [place] that she voices concerns over the fact she doesn't have the money to pay it anymore or get her stuff here so she'll lose all her belongings  Prior Function   Level of [place] Independent with ADLs; Independent with functional mobility; Independent with IADLS   Lives With Other (Comment)  (ex-family )   Receives Help From Other (Comment)  (reports limited supports)   IADLs Family/Friend/Other provides meals; Family/Friend/Other provides transportation; Family/Friend/Other provides medication management   Falls in the last 6 months 1 to 4  (3)   Vocational Part time employment   Comments (+)    Lifestyle   Autonomy I w/ ADLS/IADLS, transfers and functional mobility PTA   Reciprocal Relationships Pt lives w/ ex-family members   Service to Others works part time at [employer]   Intrinsic Gratification Enjoys taking her dog Hines for walks & crossstitch   ADL   Eating Assistance 5  Supervision/Setup   Grooming Assistance 5  [address] [address]  [address] [facility] 5  [address] cueing;Supervison/safety; Increased time to complete;Perineal hygiene;Grab bar use   Functional Assistance 4  Minimal Assistance   Functional Deficit Steadying;Supervision/safety;Verbal cueing; Increased time to complete  (CGA)   Bed Mobility   Supine to Sit 5  Supervision   Additional items HOB elevated; Increased time required;Verbal cues   Sit to Supine Unable to assess   Additional Comments Pt sat EOB w/ Fair sitting balance/trunk control   Transfers   Sit to Stand 5  Supervision   Additional items Increased time required;Verbal cues   Stand to Sit 5  Supervision   Additional items Increased time required;Verbal cues   Additional Comments no AD/DME used   Functional Mobility   Functional Mobility 4  Minimal assistance   Additional Comments Pt ambulated short household distance w/ CGA; no AD/DME Used   Balance   Static Sitting Good   Dynamic Sitting Fair +   Static Standing Fair   Dynamic Standing Fair   Ambulatory Fair -   Activity Tolerance   Activity Tolerance Patient tolerated treatment well   Medical Staff Made Aware PT   Nurse Made Aware yes, Marisela   RUE Assessment   RUE Assessment X  (NWB in splint)   LUE Assessment   LUE Assessment WFL   Hand Function   Gross Motor Coordination Functional   Fine Motor Coordination Functional   Sensation   Light Touch No apparent deficits   Vision-Basic Assessment   Current Vision No visual deficits   Vision - Complex Assessment   Ocular Range of Motion Intact   Psychosocial   Psychosocial (WDL) X   Patient Behaviors/Mood Cooperative;Labile   Cognition   Overall Cognitive Status WFL   Arousal/Participation Responsive; Cooperative   Attention Attends with cues to redirect   Orientation Level Oriented X4   Memory Within functional limits   Following Commands Follows one step commands without difficulty   Comments Pt is pleasant and cooperative; needs occasional safety cues  Assessment   Limitation Decreased ADL status; Decreased Safe judgement during ADL;Decreased endurance;Decreased high-level ADLs; Decreased self-care trans   Prognosis Fair   Assessment Pt is a 66 y/o female seen for OT eval s/p adm to [facility] w/ a fall while walking her dog  Pt is dx'd w/ L subdural hematoma and R dorsal triquetral fracture  Pt is NWB in RUE in volar slab splint  Pt  has a past medical history of Hypertension  Pt with active OT orders and up and OOB as tolerated orders  Pt lives with her granddght's ex boyfriend and his new girlfriend in a 2 SH, pt rents room on 2nd floor, 2+8 RODRIGO  Pt was I w/  ADLS and IADLS, drove, & required no use of DME PTA   Pt is currently demonstrating the following occupational deficits:S UB ADLS, CGA LB ADLS, S bed mobility and transfers, CGA functional mobility w/o AD/DME  These deficits that are impacting pt's baseline areas of occupation are a result of the following impairments: pain, endurance, activity tolerance, functional mobility, forward functional reach, balance, functional standing tolerance, unsupportive home environment, decreased I w/ ADLS/IADLS and decreased safety awareness  The following Occupational Performance Areas to address include: bathing/shower, toilet hygiene, dressing, health maintenance, functional mobility, community mobility, clothing management, cleaning, household maintenance and job performance/volunteering  Recommend home with family support upon D/C, pending progress  Pt to continue to benefit from acute immediate OT services to address the following goals 2-3x/week to  w/in 10-14 days:   Goals   Patient Goals to move somewhere else and get all her belongings back   LTG Time Frame 10-   Long Term Goal #1 see below listed goals   Plan   Treatment Interventions ADL retraining;Functional transfer training; Endurance training;Patient/family training;Equipment evaluation/education; Compensatory technique education;Continued evaluation; Energy conservation; Activityengagement   Goal Expiration Date 23   OT Frequency 2-3x/wk   Recommendation   OT Discharge Recommendation No rehabilitation needs  (pending progress)   Additional Comments  The patient's raw score on the AM-PAC Daily Activity Inpatient Short Form is 21  A raw score of greater than or equal to 19 suggests the patient may benefit from discharge to home  Please refer to the recommendation of the Occupational Therapist for safe discharge planning  Additional Comments 2 Pt seen as a co-session due to the patient's co-morbidities, clinically unstable presentation, and present impairments which are a regression from the patient's baseline     AM-PAC Daily Activity Inpatient   Lower Body Dressing 3   Bathing 3   Toileting 3   Upper Body Dressing 4   Grooming 4   Eating 4   Daily Activity Raw Score 21   Daily Activity Standardized Score (Calc for Raw Score >=11) 44 27   AM-PAC Applied Cognition Inpatient   Following a Speech/Presentation 3   Understanding Ordinary Conversation 4   Taking Medications 4   Remembering Where Things Are Placed or Put Away 4   Remembering List of 4-5 Errands 4   Taking Care of Complicated Tasks 3   Applied Cognition Raw Score 22   Applied Cognition Standardized Score 47 83   End of Consult   Education Provided Yes   Patient Position at End of Consult Bedside chair; All needs within reach;Bed/Chair alarm activated   Nurse Communication Nurse aware of consult       GOALS    1) Pt will improve activity tolerance to G for 30 min txment sessions for increase engagement in functional tasks  2) Pt will complete UB/LB dressing/self care w/ mod I using adaptive device and DME as needed  3) Pt will complete bathing w/ Mod I w/ use of AE and DME as needed  4) Pt will complete toileting w/ mod I w/ G hygiene/thoroughness using DME as needed  5) Pt will improve functional transfers to Mod I on/off all surfaces using DME as needed w/ G balance/safety   6) Pt will improve functional mobility during ADL/IADL/leisure tasks to Mod I using DME as needed w/ G balance/safety   7) Pt will participate in simulated IADL management task to increase independence to Mod I w/ G safety and endurance  8) Pt will be attentive 100% of the time during ongoing cognitive assessment w/ G participation to assist w/ safe d/c planning/recommendations  9) Pt will demonstrate G carryover of pt/caregiver education and training as appropriate w/o cues w/ good tolerance to increase safety during functional tasks  10) Pt will demonstrate 100% carryover of energy conservation techniques & WBS t/o functional I/ADL/leisure tasks w/o cues s/p skilled education to increase endurance during functional tasks     Meenu Jones MS, OTR/L

## 2023-06-05 NOTE — ASSESSMENT & PLAN NOTE
- Right triquetral fracture, present on admission   - Appreciate Orthopedic surgery evaluation, recommendations and interventions as noted  - Maintain non weightbearing status on the right upper extremity in volar splint   - Monitor right upper extremity neurovascular exam   - Continue multimodal analgesic regimen   - Continue DVT prophylaxis  - PT and OT evaluation and treatment as indicated  - Outpatient follow up with Orthopedic surgery for re-evaluation

## 2023-06-05 NOTE — PROGRESS NOTES
Daily Progress Note - Critical Care   Michelle Signs 67 y o  female MRN: [id number]  Unit/Bed#: ICU 02 Encounter: [id number]        ----------------------------------------------------------------------------------------  HPI/24hr events: Patient is a 66 yo female who presented with a traumatic SDH after falling while walking her dog  She was also found to have a R triquetral fracture  While at [address] she was found to have a 9mm left subdural with 3 mm midline shift  GCS was 15  Overnight: No acute events overnight     ---------------------------------------------------------------------------------------  SUBJECTIVE  Patient's headache has improved since her arrival  She has no new complaints  Review of Systems  Review of systems was reviewed and negative unless stated above in HPI/24-hour events   ---------------------------------------------------------------------------------------  Assessment and Plan:    Neuro:   ? Diagnosis:Acute SDH w/ 3mm Midline Shift   - q1 neuro checks  - Keppra seizure ppx  - NSG consult  - Appreciate recommendations  - CTH AM pending  - Repeat CTH if GCS declines >2  - Hold chemical DVT PPx       CV:   • Diagnosis: Hypertension   o Plan:   - C/w Amlodipine 5mg daily       Pulm:  o No active issues       GI:   o No active issues      :   o No active issues       F/E/N:   ? Electrolytes-trend and replete as indicated  ? Nutrition- Regular diet      Heme/Onc:   o No active issues      Endo:   · No active issues      ID:   o No active issues       MSK/Skin:   ? Diagnosis: R Triquetral fracture  - Ortho consulted  - Non operative management  - Pain control  - Oxycodone 5mg Q4H PRN severe pain  - Oxycodone 2 5mg Q4H PRN moderate pain  ? PT/OT  ?  OOB to chair as tolerated    Disposition: Continue Critical Care   Code Status: Level 1 - Full Code  ---------------------------------------------------------------------------------------  ICU CORE MEASURES    Prophylaxis   VTE Pharmacologic Prophylaxis: Pharmacologic VTE Prophylaxis contraindicated due to Subdural  VTE Mechanical Prophylaxis: sequential compression device  Stress Ulcer Prophylaxis: Prophylaxis Not Indicated     ABCDE Protocol (if indicated)  Plan to perform spontaneous awakening trial today? Not applicable  Plan to perform spontaneous breathing trial today? Not applicable  Obvious barriers to extubation? Not applicable  CAM-ICU: Positive    Invasive Devices Review  Invasive Devices     Peripheral Intravenous Line  Duration           Peripheral IV 06/05/23 Left;Ventral (anterior) Forearm <1 day              Can any invasive devices be discontinued today? Yes  ---------------------------------------------------------------------------------------  OBJECTIVE    Physical Exam  Vitals and nursing note reviewed  Constitutional:       General: She is not in acute distress  Appearance: She is well-developed  HENT:      Head: Normocephalic  Comments: Ecchymosis over left orbit     Nose: No congestion or rhinorrhea  Mouth/Throat:      Mouth: Mucous membranes are moist    Eyes:      Extraocular Movements: Extraocular movements intact  Conjunctiva/sclera: Conjunctivae normal       Pupils: Pupils are equal, round, and reactive to light  Cardiovascular:      Rate and Rhythm: Normal rate and regular rhythm  Heart sounds: No murmur heard  Pulmonary:      Effort: Pulmonary effort is normal  No respiratory distress  Breath sounds: Normal breath sounds  Abdominal:      Palpations: Abdomen is soft  Tenderness: There is no abdominal tenderness  There is no guarding or rebound  Musculoskeletal:      Cervical back: Neck supple  No tenderness  Right lower leg: No edema  Left lower leg: No edema  Skin:     General: Skin is warm and dry  Capillary Refill: Capillary refill takes less than 2 seconds  Neurological:      General: No focal deficit present        Mental Status: She is alert "and oriented to person, place, and time  Sensory: No sensory deficit  Motor: No weakness  Comments: Strength 5/5 bilateral upper and lower extremities  Sensation intact bilaterally  Psychiatric:         Mood and Affect: Mood normal          Vitals   Vitals:    23 1000 23 1100 23 1200 23 1300   BP: 103/64 127/73 117/61 105/74   BP Location:  Right arm     Pulse: 74 70 78 72   Resp: (!) 30 (!) 28 16 18   Temp:  98 7 °F (37 1 °C)     TempSrc:  Oral     SpO2: 99% 98% 96% 99%   Weight:       Height:         Temp (24hrs), Av 3 °F (36 8 °C), Min:98 °F (36 7 °C), Max:98 7 °F (37 1 °C)  Current: Temperature: 98 7 °F (37 1 °C)    Respiratory:  SpO2: SpO2: 99 %       Invasive/non-invasive ventilation settings   Respiratory    Lab Data (Last 4 hours)    None         O2/Vent Data (Last 4 hours)    None                Height and Weights   Height: 5' 6\" (167 6 cm)  IBW (Ideal Body Weight): 59 3 kg  Body mass index is 25 48 kg/m²  Weight (last 2 days)     Date/Time Weight    23 2300 71 6 (157 85)          Intake and Output  I/O        0701   0700  0701   0700    P  O   120    Total Intake(mL/kg)  120 (1 7)    Urine (mL/kg/hr)  500    Total Output  500    Net  -380                  Nutrition       Diet Orders   (From admission, onward)             Start     Ordered    23 1313  Diet Regular; Regular House  Diet effective now        References:    Adult Nutrition Support Algorithm    RD Therapeutic Diet Order Protocol   Question Answer Comment   Diet Type Regular    Regular Regular House    RD to adjust diet per protocol?  Yes        23 1313                  Laboratory and Diagnostics:  Results from last 7 days   Lab Units 23  0552 23  1041   EOS PCT %  --  1   HEMATOCRIT % 38 5 42 0   HEMOGLOBIN g/dL 13 0 13 7   MONOS PCT %  --  9   NEUTROS PCT %  --  71   PLATELETS Thousands/uL 215 221   WBC Thousand/uL 7 72 9 03     Results from last 7 " "days   Lab Units 06/05/23  0552 06/04/23  1041   ANION GAP mmol/L -1* 5   BUN mg/dL 21 18   CALCIUM mg/dL 9 7 10 0   CHLORIDE mmol/L 106 106   CO2 mmol/L 30 30   CREATININE mg/dL 1 04 0 82   GLUCOSE RANDOM mg/dL 101 97   POTASSIUM mmol/L 4 2 4 4   SODIUM mmol/L 135 141     Results from last 7 days   Lab Units 06/05/23  0552   MAGNESIUM mg/dL 2 1   PHOSPHORUS mg/dL 3 7                   ABG:    VBG:          Micro        EKG:   Imaging:  I have personally reviewed pertinent reports  Active Medications  Scheduled Meds:  Current Facility-Administered Medications   Medication Dose Route Frequency Provider Last Rate   • acetaminophen  975 mg Oral Q6H Collette Nettle, MD     • amLODIPine  5 mg Oral Daily Anu Pavon MD     • chlorhexidine  15 mL Mouth/Throat Q12H [address] Anu Pavon MD     • enoxaparin  30 mg Subcutaneous Q12H [address] SOBEIDA Ibrahim     • gabapentin  300 mg Oral BID Anu Pavon MD     • labetalol  20 mg Intravenous Q4H PRN Anu Pavon MD     • levETIRAcetam  500 mg Oral Q12H [address] Anu Pavon MD     • oxyCODONE  5 mg Oral Q4H PRN Anu Pavon MD     • oxyCODONE  2 5 mg Oral Q4H PRN Anu Pavon MD     • senna-docusate sodium  1 tablet Oral Daily SOBEIDA Ibrahim     • sertraline  50 mg Oral Daily SOBEIDA Ibrahim       Continuous Infusions:     PRN Meds:   labetalol, 20 mg, Q4H PRN  oxyCODONE, 5 mg, Q4H PRN  oxyCODONE, 2 5 mg, Q4H PRN        Allergies   Allergies   Allergen Reactions   • Dust Mite Extract Other (See Comments)     And dust mites       Advance Directive and Living Will:      Power of :    POLST:      Counseling / Coordination of Care  Total Critical Care time spent 30 minutes excluding procedures, teaching and family updates  Claudean Port, MD      Portions of the record may have been created with voice recognition software    Occasional wrong word or \"sound a like\" substitutions may have occurred due to the " inherent limitations of voice recognition software    Read the chart carefully and recognize, using context, where substitutions have occurred

## 2023-06-05 NOTE — ASSESSMENT & PLAN NOTE
Acute left frontotemporal SDH  · S/p mechanical fall while walking dog 6/4  · No history of AC/AP  · Current exam is non-focal  GCS 15     Imaging:  · CT head wo, 6/4/2023: Acute left frontotemporal subdural hematoma with local mass effect is similar to the study from yesterday  Plan:  · Continue to monitor neuro exam closely  · STAT CT head with decline in GCS > 2 pts in 1 hour  · SDH stable on repeat imaging  Will monitor outpatient in 2 weeks to evaluate for development of chronic SDH  · Keppra per trauma team   · Mobilize with PT/OT  · DVT ppx: SCDs, ok to initiate pharmacologic DVT ppx  Neurosurgery will sign off  Follow up outpatient in 2 weeks with repeat CT head  Call with questions

## 2023-06-05 NOTE — PLAN OF CARE
Problem: OCCUPATIONAL THERAPY ADULT  Goal: Performs self-care activities at highest level of function for planned discharge setting  See evaluation for individualized goals  Description: Treatment Interventions: ADL retraining, Functional transfer training, Endurance training, Patient/family training, Equipment evaluation/education, Compensatory technique education, Continued evaluation, Energy conservation, Activityengagement          See flowsheet documentation for full assessment, interventions and recommendations  Note: Limitation: Decreased ADL status, Decreased Safe judgement during ADL, Decreased endurance, Decreased high-level ADLs, Decreased self-care trans  Prognosis: Fair  Assessment: Pt is a 68 y/o female seen for OT eval s/p adm to [facility] w/ a fall while walking her dog  Pt is dx'd w/ L subdural hematoma and R dorsal triquetral fracture  Pt is NWB in RUE in volar slab splint  Pt  has a past medical history of Hypertension  Pt with active OT orders and up and OOB as tolerated orders  Pt lives with her granddght's ex boyfriend and his new girlfriend in a 2 SH, pt rents room on 2nd floor, 2+8 RODRIGO  Pt was I w/  ADLS and IADLS, drove, & required no use of DME PTA  Pt is currently demonstrating the following occupational deficits:S UB ADLS, CGA LB ADLS, S bed mobility and transfers, CGA functional mobility w/o AD/DME  These deficits that are impacting pt's baseline areas of occupation are a result of the following impairments: pain, endurance, activity tolerance, functional mobility, forward functional reach, balance, functional standing tolerance, unsupportive home environment, decreased I w/ ADLS/IADLS and decreased safety awareness  The following Occupational Performance Areas to address include: bathing/shower, toilet hygiene, dressing, health maintenance, functional mobility, community mobility, clothing management, cleaning, household maintenance and job performance/volunteering   Recommend home with family support upon D/C, pending progress   Pt to continue to benefit from acute immediate OT services to address the following goals 2-3x/week to  w/in 10-14 days:     OT Discharge Recommendation: No rehabilitation needs (pending progress)     Jaimie Jerome MS, OTR/L

## 2023-06-05 NOTE — UTILIZATION REVIEW
Initial Clinical Review    Pt initially presented to [address] [place] [address]  Pt was transferred by EMS to [address] [place] [address] for a higher level of care w/ trauma and neurosurgery  Admission: Date/Time/Statement:   Admission Orders (From admission, onward)     Ordered        06/04/23 1313  Inpatient Admission  Once                      Orders Placed This Encounter   Procedures   • Inpatient Admission     Standing Status:   Standing     Number of Occurrences:   1     Order Specific Question:   Level of Care     Answer:   Level 1 Stepdown [13]     Order Specific Question:   Estimated length of stay     Answer:   More than 2 Midnights     Order Specific Question:   Certification     Answer:   I certify that inpatient services are medically necessary for this patient for a duration of greater than two midnights  See H&P and MD Progress Notes for additional information about the patient's course of treatment  ED Arrival Information     Expected   6/4/2023 11:55    Arrival   6/4/2023 12:25    Acuity   Emergent            Means of arrival   Ambulance    Escorted by   Stephany Otoole    Service   Trauma    Admission type   Emergency            Arrival complaint   -           Chief Complaint   Patient presents with   • Trauma       Initial Presentation: 67 y o  female who presented from [address] [place] [address] by EMS to [address] [place] [address] ED  Inpatient admission for evaluation and treatment of SDH  PMHx: HTN, HLD  Presented after mechanical fall  Noted to have L SDH, hand fracture  On exam, GCS 15, L periorbital hematoma w/ abrasion  Plan: admit to ICU, q1h neuro cehcks, keppra, local wound care for forehead, PT/OT evals  Ortho, Neurosurgery consulted  Ortho: Pt S/P fall with right dorsal triquetral fracture  No indication for operative intervention  Placed in volar slab splint  NWB RUE  PT/OT  Analgesics  Date: 06/05/23   Day 2: Pt notes headache improving   On exam, ecchymosis over L orbit, strength and sensation intact  Plan: q1h neuro checks, keppra, repeat CTH this am, continue amlodipine, Trend labs, replete electrolytes as needed; analgesics, PT/OT  Neurosurgery: Repeat CTH similar to prior  Close monitoring of neuro exam, keppra, mobilize w/ PT/OT; follow-up outpatient       ED Triage Vitals   Temperature Pulse Respirations Blood Pressure SpO2   06/04/23 1230 06/04/23 1230 06/04/23 1230 06/04/23 1230 06/04/23 1230   97 6 °F (36 4 °C) 74 16 (!) 200/95 97 %      Temp Source Heart Rate Source Patient Position - Orthostatic VS BP Location FiO2 (%)   06/04/23 1230 06/04/23 1230 06/04/23 1945 06/04/23 1945 --   Oral Monitor Sitting Right arm       Pain Score       06/04/23 1230       10 - Worst Possible Pain          Wt Readings from Last 1 Encounters:   06/04/23 71 6 kg (157 lb 13 6 oz)     Additional Vital Signs:   Date/Time Temp Pulse Resp BP MAP (mmHg) SpO2 O2 Device   06/05/23 0700 -- 68 16 105/68 86 97 % --   06/05/23 0600 -- 66 15 122/70 102 97 % --   06/05/23 0500 -- 70 15 102/63 78 97 % --   06/05/23 0400 -- 72 14 101/64 74 98 % --   06/05/23 0300 -- 76 16 107/64 81 97 % --   06/05/23 0100 -- 76 17 97/62 73 97 % --   06/05/23 0000 -- 74 19 112/63 84 96 % --   06/04/23 2332 -- 72 18 111/64 78 97 % --   06/04/23 2300 98 °F (36 7 °C) 74 18 129/69 92 96 % None (Room air)   06/04/23 2145 -- 80 16 95/51 68 96 % None (Room air)   06/04/23 2059 -- 82 -- 128/75 -- 96 % None (Room air)   06/04/23 1945 -- 80 18 126/71 92 95 % None (Room air)   06/04/23 1830 -- 74 18 114/57 -- 96 % None (Room air)   06/04/23 1730 -- 84 18 174/88 Abnormal  -- 97 % None (Room air)   06/04/23 1630 -- 84 16 148/87 -- 97 % None (Room air)   06/04/23 1530 -- 86 16 164/87 -- 97 % None (Room air)   06/04/23 1430 -- 84 16 149/90 -- 96 % None (Room air)   06/04/23 1337 -- -- -- 189/97 Abnormal  -- -- --     Royce Coma Scale  Date and Time Eye Opening Best Verbal Response Best Motor Response Royce Coma Scale Score   06/05/23 0700 4 5 6 15   06/05/23 0600 4 5 6 15   06/05/23 0500 4 5 6 15   06/05/23 0400 4 5 6 15   06/05/23 0300 4 5 6 15   06/05/23 0200 4 5 6 15   06/05/23 0100 4 5 6 15   06/05/23 0000 4 5 6 15   06/04/23 2300 4 5 6 15   06/04/23 2130 4 5 6 15   06/04/23 2030 4 5 6 15   06/04/23 1930 4 5 6 15   06/04/23 1830 4 5 6 15   06/04/23 1730 4 5 6 15   06/04/23 1630 4 5 6 15   06/04/23 1530 4 5 6 15   06/04/23 1430 4 5 6 15   06/04/23 1330 4 5 6 15   06/04/23 1230 4 5 6 15   06/04/23 1130 4 5 6 15   06/04/23 1030 4 5 6 15   06/04/23 1002 4 5 6 15     Pertinent Labs/Diagnostic Test Results:   CT head wo contrast   Final Result by Clara Dyer MD (06/05 6959)      Acute left frontotemporal subdural hematoma with local mass effect is similar to the study from yesterday                    Workstation performed: [id number]               Results from last 7 days   Lab Units 06/05/23  0552 06/04/23  1041   HEMATOCRIT % 38 5 42 0   HEMOGLOBIN g/dL 13 0 13 7   NEUTROS ABS Thousands/µL  --  6 43   PLATELETS Thousands/uL 215 221   WBC Thousand/uL 7 72 9 03         Results from last 7 days   Lab Units 06/05/23  0552 06/04/23  1041   ANION GAP mmol/L -1* 5   BUN mg/dL 21 18   CALCIUM mg/dL 9 7 10 0   CHLORIDE mmol/L 106 106   CO2 mmol/L 30 30   CREATININE mg/dL 1 04 0 82   EGFR ml/min/1 73sq m 53 71   POTASSIUM mmol/L 4 2 4 4   MAGNESIUM mg/dL 2 1  --    PHOSPHORUS mg/dL 3 7  --    SODIUM mmol/L 135 141             Results from last 7 days   Lab Units 06/05/23  0552 06/04/23  1041   GLUCOSE RANDOM mg/dL 101 97         ED Treatment:   Medication Administration from 06/04/2023 1103 to 06/04/2023 2305       Date/Time Order Dose Route Action     06/04/2023 2211 EDT chlorhexidine (PERIDEX) 0 12 % oral rinse 15 mL 15 mL Mouth/Throat Given     06/04/2023 1337 EDT amLODIPine (NORVASC) tablet 5 mg 5 mg Oral Given     06/04/2023 1832 EDT gabapentin (NEURONTIN) capsule 300 mg 300 mg Oral Given     06/04/2023 1952 EDT acetaminophen (TYLENOL) tablet 975 mg 975 mg Oral Given     06/04/2023 1337 EDT acetaminophen (TYLENOL) tablet 975 mg 975 mg Oral Given     06/04/2023 1455 EDT levETIRAcetam (KEPPRA) tablet 500 mg 500 mg Oral Given        Past Medical History:   Diagnosis Date   • Hypertension      Present on Admission:  **None**      Admitting Diagnosis: Subdural hematoma ([facility] [address] ) [S06  5XAA]  Triquetral chip fracture, right, closed, initial encounter [S62 111A]  Unspecified multiple injuries, initial encounter [T07  XXXA]  Age/Sex: 67 y o  female  Admission Orders:   Regular Diet  Fall precautions  Incentive spirometry  Continuous Cardio-Pulm monitoring  DW  I&O  SCDs   q1h neuro checks  Scheduled Medications:  acetaminophen, 975 mg, Oral, Q6H [address]  amLODIPine, 5 mg, Oral, Daily  chlorhexidine, 15 mL, Mouth/Throat, Q12H PORSHA  enoxaparin, 30 mg, Subcutaneous, Q12H PORSHA  gabapentin, 300 mg, Oral, BID  levETIRAcetam, 500 mg, Oral, Q12H PORSHA  senna-docusate sodium, 1 tablet, Oral, Daily  sertraline, 50 mg, Oral, Daily    Continuous IV Infusions: none     PRN Meds:  labetalol, 20 mg, Intravenous, Q4H PRN  oxyCODONE, 5 mg, Oral, Q4H PRN  oxyCODONE, 2 5 mg, Oral, Q4H PRN        IP CONSULT TO NEUROSURGERY  IP CONSULT TO CASE MANAGEMENT  IP CONSULT TO GERONTOLOGY  IP CONSULT TO ORTHOPEDIC SURGERY    Network Utilization Review Department  ATTENTION: Please call with any questions or concerns to [phone] and carefully listen to the prompts so that you are directed to the right person  All voicemails are confidential   Sharita Garner all requests for admission clinical reviews, approved or denied determinations and any other requests to dedicated fax number below belonging to the campus where the patient is receiving treatment   List of dedicated fax numbers for the Facilities:  [address] DENIALS (Administrative/Medical Necessity) [phone]   [facility] (Maternity/NICU/Pediatrics) [phone]     [address] [place]  [address] [phone]   [address] [place] [address] Jose  [facility] [address] [place] [address] 310 [facility] 134 885 [facility] [phone]

## 2023-06-05 NOTE — CASE MANAGEMENT
Case Management Assessment & Discharge Planning Note    Patient name Thea Nation  Location [address] [facility] 610/PPHP 117-46 MRN [id number]  : 1950 Date 2023       Current Admission Date: 2023  Current Admission Diagnosis:Subdural hematoma [facility])   Patient Active Problem List    Diagnosis Date Noted   • [facility] 2023   • Hyperlipidemia 2023   • Hypertension 2023   • Mood disorder ([facility] [address] ) 2023   • Traumatic hematoma of forehead 2023   • Triquetral chip fracture, right, closed, initial encounter 2023      LOS (days): 1  Geometric Mean LOS (GMLOS) (days): 4 00  Days to GMLOS:2 9     OBJECTIVE:    Risk of Unplanned Readmission Score: 6 04         Current admission status: Inpatient       Preferred Pharmacy:   [facility] [address] SARY Ornelas  [address]  [address]  [place] [facility] [address]  Phone: [phone] Fax: [phone]    Primary Care Provider: No primary care provider on file  Primary Insurance: Beverly COPE  Secondary Insurance:     ASSESSMENT:  [address] 26 Proxies    There are no active Health Care Proxies on file  DISCHARGE DETAILS:        CM met with pt to discuss d/c planning  Pt rents a room from her grandchild's ex boyfriend and his partner  Pt was interested in housing resources which CM provided  CM also reviewed the process to secure SNAP benefits and medical assistance, as the pt had this while living in [place] but has moved to [place] in September and never transferred benefits  Pt was satisfied with all this information  CM reviewed d/c planning process including the following: identifying help at home, patient preference for d/c planning needs, Discharge Lounge, [facility] Meds to Bed program, availability of treatment team to discuss questions or concerns patient and/or family may have regarding understanding medications and recognizing signs and symptoms once discharged    CM also encouraged patient to follow up with all recommended appointments after discharge  Patient advised of importance for patient and family to participate in managing patient’s medical well being

## 2023-06-05 NOTE — ASSESSMENT & PLAN NOTE
? Diagnosis: R Triquetral fracture  - Ortho consulted  - Non operative management  - Pain control  - Oxycodone 5mg Q4H PRN severe pain  - Oxycodone 2 5mg Q4H PRN moderate pain

## 2023-06-05 NOTE — CONSULTS
Orthopedics   Donavan Isaacs 67 y o  female MRN: [id number]  Unit/Bed#: ED 18      Chief Complaint:   right wrist pain    HPI:   67 y o  right hand dominant female status post fall complaining of right wrist pain  Earlier today patient was walking her dog and she tripped on the sidewalk and fell forward onto her right arm and her head  Patient found to have left subdural hematoma and forehead abrasion  Right wrist pain located to dorsal aspect of ulnar wrist  It does not radiate  Denies numbness or paresthesias  No other complaints at this time  PMH significant for HTN, hyperlipidemia  Does not take blood thinners  Review Of Systems:   · Skin: See HPI  · Neuro: See HPI  · Musculoskeletal: See HPI  · 14 point review of systems negative except as stated above     Past Medical History:   No past medical history on file  Past Surgical History:   No past surgical history on file  Family History:  Family history reviewed and non-contributory  No family history on file  Social History:  Social History     Socioeconomic History   • Marital status: Unknown     Spouse name: Not on file   • Number of children: Not on file   • Years of education: Not on file   • Highest education level: Not on file   Occupational History   • Not on file   Tobacco Use   • Smoking status: Never   • Smokeless tobacco: Never   Substance and Sexual Activity   • Alcohol use: Yes   • Drug use: Not on file   • Sexual activity: Not on file   Other Topics Concern   • Not on file   Social History Narrative   • Not on file     Social Determinants of Health     Financial Resource Strain: Not on file   Food Insecurity: Not on file   Transportation Needs: Not on file   Physical Activity: Not on file   Stress: Not on file   Social Connections: Not on file   Intimate Partner Violence: Not on file   Housing Stability: Not on file       Allergies:    Allergies   Allergen Reactions   • Dust Mite Extract Other (See Comments)     And dust mites "          Labs:  0   Lab Value Date/Time    HCT 42 0 06/04/2023 1041    HGB 13 7 06/04/2023 1041    WBC 9 03 06/04/2023 1041       Meds:    Current Facility-Administered Medications:   •  acetaminophen (TYLENOL) tablet 975 mg, 975 mg, Oral, Q6H [facility], Christine Laguerre MD, 975 mg at 06/04/23 1952  •  amLODIPine (NORVASC) tablet 5 mg, 5 mg, Oral, Daily, Christine Laguerre MD, 5 mg at 06/04/23 1337  •  chlorhexidine (PERIDEX) 0 12 % oral rinse 15 mL, 15 mL, Mouth/Throat, Q12H [facility], Christine Laguerre MD  •  gabapentin (NEURONTIN) capsule 300 mg, 300 mg, Oral, BID, Christine Laguerre MD, 300 mg at 06/04/23 1832  •  labetalol (NORMODYNE) injection 20 mg, 20 mg, Intravenous, Q4H PRN, Christine Laguerre MD  •  levETIRAcetam (KEPPRA) tablet 500 mg, 500 mg, Oral, Q12H [facility], Christine Laguerre MD, 500 mg at 06/04/23 1455  •  oxyCODONE (ROXICODONE) IR tablet 5 mg, 5 mg, Oral, Q4H PRN, Christine Laguerre MD  •  oxyCODONE (ROXICODONE) split tablet 2 5 mg, 2 5 mg, Oral, Q4H PRN, Christine Laguerre MD    Current Outpatient Medications:   •  amLODIPine (NORVASC) 5 mg tablet, Take 5 mg by mouth daily, Disp: , Rfl:   •  celecoxib (CeleBREX) 100 mg capsule, , Disp: , Rfl:   •  gabapentin (NEURONTIN) 300 mg capsule, Take 300 mg by mouth 2 (two) times a day, Disp: , Rfl:   •  methylPREDNISolone 4 MG tablet therapy pack, Use as directed on package (Patient not taking: Reported on 6/4/2023), Disp: 21 each, Rfl: 0  •  Paxlovid, 300/100, tablet therapy pack, , Disp: , Rfl:   •  sertraline (ZOLOFT) 50 mg tablet, Take 50 mg by mouth daily (Patient not taking: Reported on 5/25/2023), Disp: , Rfl:   •  valACYclovir (VALTREX) 1,000 mg tablet, TAKE 1 TABLET BY MOUTH THREE TIMES DAILY FOR 7 DAYS - AT ONSET OF SHINGLES RASH (Patient not taking: Reported on 2/6/2023), Disp: , Rfl:     Blood Culture:   No results found for: \"BLOODCX\"    Wound Culture:   No results found for: \"WOUNDCULT\"    Ins and Outs:  I/O last 24 hours:   In: -   Out: " 500 [Urine:500]          Physical Exam:   /75   Pulse 82   Temp 97 6 °F (36 4 °C) (Oral)   Resp 18   SpO2 96%   Gen: No acute distress, resting comfortably in bed  HEENT: Eyes clear, moist mucus membranes, hearing intact  Respiratory: No audible wheezing or stridor  Cardiovascular: Well Perfused peripherally, 2+ distal pulse  Abdomen: nondistended, no peritoneal signs  Musculoskeletal: right upper extremity  · Skin intact  · TTP to ulnar aspect of wrist  · Mild edema to dorsal ulnar aspect  · Tender to palpation over dorsal ulnar aspect of wrist  · Sensation intact to median, radial, and ulnar distributions  · Motor intact to ain/pin/m/r/u  · Finger tips warm and well perfused, 2+ radial pulse  · Shoulder and elbow - full ROM without pain, non-tender to palpation    Left upper extremity examined  No deformity present, non-tender to palpation, no pain with range of motion  Left lower extremity examined  No deformity present  Superficial abrasion over anterior aspect of knee  Mildly tender to palpation surrounding abrasion  No pain with ROM  Radiology:   I personally reviewed the films  X-rays AP/Lateral and oblique views right wrist demonstrates fracture to dorsal aspect of triquetrum  AP/Lateral and oblique views left shoulder demonstrates no fractures or dislocation  AP/Lateral of left knee demonstrates no fractures or dislocation  Tricompartmental arthritis present      _*_*_*_*_*_*_*_*_*_*_*_*_*_*_*_*_*_*_*_*_*_*_*_*_*_*_*_*_*_*_*_*_*_*_*_*_*_*_*_*_*    Assessment:  67 y  o female S/P fall with right dorsal triquetral fracture  No indication for operative intervention from orthopaedic surgery  Patient placed in volar slab splint      Plan:   · Non weight bearing right upper extremity in volar slab splint  · PT/OT  · Analgesics for pain   · BMI 25   · Dispo: Orthopaedics will follow, dispo per trauma/SICU team  · Discussed with senior resident    Ada Hayes MD

## 2023-06-05 NOTE — PROGRESS NOTES
[address]  Progress Note  Name: Brown Miranda  MRN: [id number]  Unit/Bed#: [facility] 610-01 I Date of Admission: 6/4/2023   Date of Service: 6/5/2023 I Hospital Day: 1    Assessment/Plan   * Subdural hematoma [facility])  Assessment & Plan  ? Diagnosis:Acute SDH w/ 3mm Midline Shift   - q4 neuro checks  - Keppra seizure ppx  - NSG consult  - No surgical intervention planned  Recommends f/u outpatient in 2 weeks  · CTH 6/5 Acute left frontotemporal subdural hematoma with local mass effect is similar to the study from yesterday    - Repeat CTH if GCS declines >2  - Begin Lovenox     Traumatic hematoma of forehead  Assessment & Plan  · Q4H neuro checks   · Pain control as needed     Triquetral chip fracture, right, closed, initial encounter  Assessment & Plan  ? Diagnosis: R Triquetral fracture  - Ortho consulted  - Non operative management  - Pain control  - Oxycodone 5mg Q4H PRN severe pain  - Oxycodone 2 5mg Q4H PRN moderate pain    Mood disorder (HCC)  Assessment & Plan  · Continue with Zoloft 50mg daily     Hypertension  Assessment & Plan  · Continue with Amlodipine 5mg daily        -----------------------------------------------------------------------------------------------------------------------------------------------------------    Code Status: Level 1 - Full Code    Reason for ICU/Stepdown Admission: Subdural hematoma     Consultants:  Neurosurgery   Gerontology  Orthopedic surgery  Case management    HPI:   Patient is a 68 yo female with a past medical history of hypertension who presented with a traumatic SDH after walking her dog  She presented to the [address] [place] [address] and was found to have a 9 mm left subdural hematoma with a 3mm midline shift  She was a GCS 15 on arrival and required frequent neurological checks  She was also found to have a triquetral fracture of her right wrist       Summary of clinical course:   On arrival, her CT showed 9 mm left subdural hematoma with a 3mm midline shift  Neurosurgery was consulted who recommended no neurosurgical intervention at the time  Frequent neurological checks were performed  She was started on Keppra for seizure prophylaxis  She was evaluated by Orthopedic Surgery who placed a volar splint for her triquetral fracture  They did not recommend surgical intervention  Her neurological checks remained unchanged  Her subsequent CTH was stable from her initial CT  Her neurological checks were modified to every 4 hours  She was transferred to the Trauma service  Please refer to today's progress note for further clinical details    Recent procedures:  None    Outstanding or pending diagnostics/cultures/procedures:  None    Mobilization Plan:  PT/Out of bed      LDA's and reason for remaining devices: Invasive Devices     Peripheral Intravenous Line  Duration           Peripheral IV 06/05/23 Left;Ventral (anterior) Forearm <1 day                  Discharge Plan:  Transfer to trauma     Home medications not resumed and why:  Home medications resumed     Spoke with Dr Cristhian Simon regarding transfer to the Trauma service at [facility]

## 2023-06-05 NOTE — ASSESSMENT & PLAN NOTE
? Diagnosis:Acute SDH w/ 3mm Midline Shift   - q4 neuro checks  - Keppra seizure ppx  - NSG consult  - No surgical intervention planned   Recommends f/u outpatient in 2 weeks  · CTH 6/5 Acute left frontotemporal subdural hematoma with local mass effect is similar to the study from yesterday    - Repeat CTH if GCS declines >2  - Begin Lovenox

## 2023-06-05 NOTE — PROGRESS NOTES
"Progress Note - Orthopedics   Ed Solis 67 y o  female MRN: [id number]  Unit/Bed#: ICU 02      Subjective:    67 y  o female s/p fall with right dorsal triquetral fracture  No acute events, no new complaints  Patient doing well  Pain well controlled  Reports that she is most more comfortable in her splint  Pain has improved  Labs:  0   Lab Value Date/Time    HCT 42 0 06/04/2023 1041    HGB 13 7 06/04/2023 1041    WBC 9 03 06/04/2023 1041       Meds:    Current Facility-Administered Medications:   •  acetaminophen (TYLENOL) tablet 975 mg, 975 mg, Oral, Q6H [address], Alicia Jackson MD, 975 mg at 06/04/23 2332  •  amLODIPine (NORVASC) tablet 5 mg, 5 mg, Oral, Daily, Alicia Jackson MD, 5 mg at 06/04/23 1337  •  chlorhexidine (PERIDEX) 0 12 % oral rinse 15 mL, 15 mL, Mouth/Throat, Q12H [address], Alicia Jackson MD, 15 mL at 06/04/23 2211  •  gabapentin (NEURONTIN) capsule 300 mg, 300 mg, Oral, BID, Alicia Jackson MD, 300 mg at 06/04/23 1832  •  labetalol (NORMODYNE) injection 20 mg, 20 mg, Intravenous, Q4H PRN, Alicia Jackson MD  •  levETIRAcetam (KEPPRA) tablet 500 mg, 500 mg, Oral, Q12H [address], Alicia Jackson MD, 500 mg at 06/04/23 1455  •  oxyCODONE (ROXICODONE) IR tablet 5 mg, 5 mg, Oral, Q4H PRN, Alicia Jackson MD  •  oxyCODONE (ROXICODONE) split tablet 2 5 mg, 2 5 mg, Oral, Q4H PRN, Alicia Jackson MD    Blood Culture:   No results found for: \"BLOODCX\"    Wound Culture:   No results found for: \"WOUNDCULT\"    Ins and Outs:  I/O last 24 hours:   In: 120 [P O :120]  Out: 500 [Urine:500]          Physical:  Vitals:    06/05/23 0500   BP: 102/63   Pulse: 70   Resp: 15   Temp:    SpO2: 97%     Musculoskeletal: right Upper Extremity  · Splint clean, dry, intact  · TTP to dorsal ulnar wrist, improved compared to examination yesterday  · Sensation intact to median/radial/ulnar nerve distribution   · Motor intact anterior interosseous nerve/posterior interosseous " nerve/median/radial/ulnar nerve distributions  · Digits warm and well perfused  · Capillary refill < 2 seconds    Assessment:    67 y  o female s/p fall with dorsum of left triquetrum fracture  Patient doing well  Feels pain is improved in volar slab splint  No indication for operative intervention from orthopaedic surgery       Plan:  · NWB RUE in volar slab splint  · PT/OT  · Pain control  · DVT ppx - per critical care team  · Rest of care per primary  · Dispo: Diana Gonzalez for discharge from ortho perspective    Cameron Mccann MD

## 2023-06-05 NOTE — PHYSICAL THERAPY NOTE
Physical Therapy Evaluation     Patient's Name: Constanza Lance    Admitting Diagnosis  Subdural hematoma ([address] ) [S06  5XAA]  Triquetral chip fracture, right, closed, initial encounter [S62 111A]  Unspecified multiple injuries, initial encounter [T07  XXXA]    Problem List  Patient Active Problem List   Diagnosis    Subdural hematoma (HCC)    Hyperlipidemia    Hypertension    Mood disorder (HCC)    Traumatic hematoma of forehead    Triquetral chip fracture, right, closed, initial encounter       Past Medical History  Past Medical History:   Diagnosis Date    Hypertension        Past Surgical History  Past Surgical History:   Procedure Laterality Date    APPENDECTOMY      HYSTERECTOMY            06/05/23 0911   PT Last Visit   PT Visit Date 06/05/23   Note Type   Note type Evaluation   Pain Assessment   Pain Assessment Tool 0-10   Pain Score 6   Pain Location/Orientation Location: Head   Pain Onset/Description Descriptor: Pressure; Descriptor: Headache   Patient's Stated Pain Goal No pain   Hospital Pain Intervention(s) Medication (See MAR); Repositioned; Ambulation/increased activity   Multiple Pain Sites Yes   Pain 2   Pain Score 2 4   Pain Location/Orientation 2 Orientation: Left; Location: Knee   Pain Onset/Description 2 Descriptor: Aching;Descriptor: Discomfort   Patient's Stated Pain Goal 2 No pain   Hospital Pain Intervention(s) 2 Repositioned; Ambulation/increased activity   Restrictions/Precautions   Weight Bearing Precautions Per Order Yes   RUE Weight Bearing Per Order (S)  NWB   Braces or Orthoses (S)  Splint  (volar slab splint)   Other Precautions Chair Alarm; Bed Alarm;WBS;Multiple lines; Fall Risk;Pain   Home Living   Type of [address] [place] [address] Two level  (2STE; rents room on 2nd floor)   Bathroom Shower/Tub Tub/shower unit   Bathroom Toilet Standard   Home Equipment Cane   Prior Function   Level of [place] Independent with ADLs; Independent with functional mobility; Independent with IADLS   Lives With Other (Comment)  (Several extended family members and roommates)   Receives Help From Family   IADLs Independent with driving; Independent with meal prep; Independent with medication management   Falls in the last 6 months 1 to 4   Vocational Part time employment   General   Family/Caregiver Present No   Cognition   Overall Cognitive Status WFL   Orientation Level Oriented X4   Following Commands Follows all commands and directions without difficulty   Subjective   Subjective Pt stated she was feeling okay; reported pain in her head and knee, but was otherwise agreeable to therapy today  RLE Assessment   RLE Assessment   (Grossly with mobility, at least 3/5)   LLE Assessment   LLE Assessment   (Grossly with mobility, at least 3/5)   Bed Mobility   Supine to Sit 5  Supervision   Additional items Impulsive   Sit to Supine Unable to assess   Additional items   (Pt left sitting comfortably in recliner, call bell in reach, and all current needs met )   Transfers   Sit to Stand 5  Supervision   Additional items Impulsive   Stand to Sit 5  Supervision   Additional items Impulsive   Ambulation/Elevation   Gait pattern Short stride   Gait Assistance 5  Supervision   Distance 150ft   Balance   Static Sitting Good   Dynamic Sitting Fair +   Static Standing Fair   Dynamic Standing Fair   Ambulatory Fair -   Activity Tolerance   Medical Staff Made Aware Co-eval with OT due to pt's current medical status   Assessment   Prognosis Good   Problem List Decreased strength;Pain   Assessment Pt is a 76yo female admitted to [facility] ICU following a fall which resulted in subdural hematoma, R dorsal triquetral fx, and bruising and abrasions of L side of face and L knee  Patient reports that she tripped while walking her dog, and that she hit her head on the sidewalk  PMH significant for HTN and hyperlipidemia  Pt was communicative and cooperative   Supervision required for all transfers and functional mobility due to decreased safety awareness and slight impulsivity  No major losses of balance observed during gait, however pt displayed slow gait with shortened stride length  Pt is currently in a volar slab splint on her RUE due to triquetral fx, and is thus NWB on her RUE; required several verbal cues to maintain NWB status during transfers  Patient would benefit from skilled PT intervention while in the hospital to address the aforementioned imairments, continued education regarding NWB status, and general mobility and strengthening to aid in improved functional mobility and independence when returning home  At end of session, pt was left sitting comfortably in recliner with call bell in reach and all current needs met  Barriers to Discharge Decreased caregiver support   Goals   Patient Goals To move out of her current living situation; to be able to keep her belongings that are currently stored in a storage unit in [facility] [address] Date 06/19/23   Short Term Goal #1 1  Pt will be I with supine-sit transfers in 2 weeks to improve independence when returning home  2  In 2 weeks, pt will ambulate 300ft I across flat surface to aid in community ambulation and improving her ability to safely walk her dog upon discharge  3  In 2 weeks, pt will be able to I negotiate at least 1 standard flight of stairs to aid in ability to safely and effectively climb stairs to her room at home  4  Pt will demonstrate decreased postural sway in static stance and during ambulation to decrease risk of falls upon discharge     Plan   Treatment/Interventions Therapeutic exercise   PT Frequency 2-3x/wk   Recommendation   PT Discharge Recommendation No rehabilitation needs  (No home PT currently recommended; follow up with ortho to determine appropriate PT needs at that time)   AM-PAC Basic Mobility Inpatient   Turning in Flat Bed Without Bedrails 4   Lying on Back to Sitting on Edge of Flat Bed Without Bedrails 4   Moving Bed to Chair 3   Standing Up From Chair Using Arms 4   Walk in Room 3   Climb 3-5 Stairs With Railing 3   Basic Mobility Inpatient Raw Score 21   Basic Mobility Standardized Score 45 55   Highest Level Of Mobility   -[facility] Goal 6: Walk 10 steps or more         Jose Cueva, SPT

## 2023-06-05 NOTE — TELEPHONE ENCOUNTER
6/5/23- PT [facility] F/U SCHEDULED 6/22/23  PT WILL NEED CTH    ----- Message from Mark Hu sent at 6/5/2023  9:18 AM EDT -----  Can we please schedule follow up for this lady in 2 weeks? Can be with AP  I've ordered CT head  Thanks!

## 2023-06-05 NOTE — CONSULTS
[facility] [facility]  Consult  Name: Russell Roberto 67 y o  female I MRN: [id number]  Unit/Bed#: ICU 02 I Date of Admission: 6/4/2023   Date of Service: 6/5/2023 I Hospital Day: 1    Inpatient consult to Neurosurgery  Consult performed by: SOBEIDA Wheeler  Consult ordered by: Glory Vick MD          Assessment/Plan   * Subdural hematoma [facility])  Assessment & Plan  Acute left frontotemporal SDH  · S/p mechanical fall while walking dog 6/4  · No history of AC/AP  · Current exam is non-focal  GCS 15     Imaging:  · CT head wo, 6/4/2023: Acute left frontotemporal subdural hematoma with local mass effect is similar to the study from yesterday  Plan:  · Continue to monitor neuro exam closely  · STAT CT head with decline in GCS > 2 pts in 1 hour  · SDH stable on repeat imaging  Will monitor outpatient in 2 weeks to evaluate for development of chronic SDH  · Keppra per trauma team   · Mobilize with PT/OT  · DVT ppx: SCDs, ok to initiate pharmacologic DVT ppx  Neurosurgery will sign off  Follow up outpatient in 2 weeks with repeat CT head  Call with questions  History of Present Illness     HPI: Russell Roberto is a 67 y o  female with PMH including HTN, who presented to [address] [place] [address] ED after sustaining a mechanical fall while walking her dog on 6/4  She does not have any history of AC/AP use  She was noted on exam with acute left SDH as well as hand fracture  She was transferred to [facility] for further care  Currently, she states she is doing well  She denies headache  She states she would like to get up and use the bathroom  She states she lives at home with non-family members  Review of Systems   Constitutional: Negative  HENT: Negative  Respiratory: Negative  Cardiovascular: Negative  Gastrointestinal: Negative  Musculoskeletal: Positive for arthralgias  Neurological: Negative    Negative for dizziness, tremors, seizures, syncope, facial asymmetry, speech difficulty, weakness, light-headedness, numbness and headaches  Historical Information   Past Medical History:   Diagnosis Date   • Hypertension      Past Surgical History:   Procedure Laterality Date   • APPENDECTOMY     • HYSTERECTOMY       Social History     Substance and Sexual Activity   Alcohol Use Yes     Social History     Substance and Sexual Activity   Drug Use Never     Social History     Tobacco Use   Smoking Status Never   Smokeless Tobacco Never     History reviewed  No pertinent family history  Meds/Allergies   all current active meds have been reviewed and current meds:   Current Facility-Administered Medications   Medication Dose Route Frequency   • acetaminophen (TYLENOL) tablet 975 mg  975 mg Oral Q6H [facility]   • amLODIPine (NORVASC) tablet 5 mg  5 mg Oral Daily   • chlorhexidine (PERIDEX) 0 12 % oral rinse 15 mL  15 mL Mouth/Throat Q12H [facility]   • gabapentin (NEURONTIN) capsule 300 mg  300 mg Oral BID   • labetalol (NORMODYNE) injection 20 mg  20 mg Intravenous Q4H PRN   • levETIRAcetam (KEPPRA) tablet 500 mg  500 mg Oral Q12H [facility]   • oxyCODONE (ROXICODONE) IR tablet 5 mg  5 mg Oral Q4H PRN   • oxyCODONE (ROXICODONE) split tablet 2 5 mg  2 5 mg Oral Q4H PRN     Allergies   Allergen Reactions   • Dust Mite Extract Other (See Comments)     And dust mites       Objective   I/O       06/03 0701  06/04 0700 06/04 0701  06/05 0700 06/05 0701  06/06 0700    P  O   120     Total Intake(mL/kg)  120 (1 7)     Urine (mL/kg/hr)  500     Total Output  500     Net  -380                  Physical Exam  Constitutional:       Appearance: She is well-developed  HENT:      Head: Normocephalic  Comments: Left periorbital ecchymosis  Eyes:      Extraocular Movements: EOM normal       Pupils: Pupils are equal, round, and reactive to light  Pulmonary:      Effort: Pulmonary effort is normal    Abdominal:      Palpations: Abdomen is soft     Musculoskeletal:         General: Normal range of motion  Cervical back: Normal range of motion and neck supple  Comments: RUE splint   Skin:     General: Skin is warm and dry  Neurological:      General: No focal deficit present  Mental Status: She is alert and oriented to person, place, and time  Mental status is at baseline  Cranial Nerves: Cranial nerves 2-12 are intact  No cranial nerve deficit  Sensory: No sensory deficit  Motor: No weakness  Coordination: Coordination normal  Finger-Nose-Finger Test normal    Psychiatric:         Speech: Speech normal        Neurologic Exam     Mental Status   Oriented to person, place, and time  Oriented to person  Oriented to place  Oriented to time  Oriented to year, month and date  Registration: recalls 3 of 3 objects  Attention: normal  Concentration: normal    Speech: speech is normal   Level of consciousness: alert  Knowledge: good and consistent with education  Able to name object  Cranial Nerves   Cranial nerves II through XII intact  CN III, IV, VI   Pupils are equal, round, and reactive to light  Extraocular motions are normal    Right pupil: Size: 3 mm  Shape: regular  Reactivity: brisk  Consensual response: intact  Accommodation: intact  Left pupil: Size: 3 mm  Shape: regular  Reactivity: brisk  Consensual response: intact  Accommodation: intact  Nystagmus: none   Diplopia: none  Conjugate gaze: present    CN V   Right facial sensation deficit: none  Left facial sensation deficit: none    CN VII   Facial expression full, symmetric       CN VIII   Hearing: intact    CN IX, X   Palate: symmetric    CN XI   Right sternocleidomastoid strength: normal  Left sternocleidomastoid strength: normal  Right trapezius strength: normal  Left trapezius strength: normal    CN XII   Tongue: not atrophic  Fasciculations: absent  Tongue deviation: none    Motor Exam   Muscle bulk: normal  Overall muscle tone: normal  Right arm pronator drift: absent  Left "arm pronator drift: absent    Strength   Right deltoid: 5/5  Left deltoid: 5/5  Right biceps: 5/5  Left biceps: 5/5  Right triceps: 5/5  Left triceps: 5/5  Right quadriceps: 5/5  Left quadriceps: 5/5  Right hamstrin/5  Left hamstrin/5  Right anterior tibial: 5/5  Left anterior tibial: 5/5  Right posterior tibial: 5/5  Left posterior tibial: 5/5  Right peroneal: 5/5  Left peroneal: 5/5  Right gastroc: 5/5  Left gastroc: 5/5RUE splint     Gait, Coordination, and Reflexes     Coordination   Finger to nose coordination: normal    Tremor   Resting tremor: absent  Intention tremor: absent  Action tremor: absent    Reflexes   Right Conklin: absent  Left Conklin: absent  Right ankle clonus: absent  Left ankle clonus: absent      Vitals:Blood pressure 105/68, pulse 68, temperature 98 °F (36 7 °C), temperature source Oral, resp  rate 16, height 5' 6\" (1 676 m), weight 71 6 kg (157 lb 13 6 oz), SpO2 97 %  ,Body mass index is 25 48 kg/m²  Lab Results:   Results from last 7 days   Lab Units 23  0552 23  1041   EOS PCT %  --  1   HEMATOCRIT % 38 5 42 0   HEMOGLOBIN g/dL 13 0 13 7   MONOS PCT %  --  9   NEUTROS PCT %  --  71   PLATELETS Thousands/uL 215 221   WBC Thousand/uL 7 72 9 03     Results from last 7 days   Lab Units 23  0552 23  1041   BUN mg/dL 21 18   CALCIUM mg/dL 9 7 10 0   CHLORIDE mmol/L 106 106   CO2 mmol/L 30 30   CREATININE mg/dL 1 04 0 82   POTASSIUM mmol/L 4 2 4 4     Results from last 7 days   Lab Units 23  0552   MAGNESIUM mg/dL 2 1     Results from last 7 days   Lab Units 23  0552   PHOSPHORUS mg/dL 3 7         No results found for: \"TROPONINT\"  ABG:No results found for: \"BEART\", \"LVG4WBH\", \"U6LQWBVH\", \"NSQ5HJU\", \"PHART\", \"PO2ART\", \"SOURCE\"    Imaging Studies: I have personally reviewed pertinent reports     and I have personally reviewed pertinent films in PACS    CT head wo contrast    Result Date: 2023  Impression: Acute left frontotemporal subdural " "hematoma with local mass effect is similar to the study from yesterday  Workstation performed: [id number]     XR knee 4+ views left injury    Result Date: 6/4/2023  Impression: No acute osseous abnormality  Degenerative changes and meniscal chondrocalcinosis as described  Workstation performed: [id number]     XR shoulder 2+ views LEFT    Result Date: 6/4/2023  Impression: No acute osseous abnormality  Workstation performed: [id number]     XR wrist 3+ views RIGHT    Result Date: 6/4/2023  Impression: Bony fragment along the dorsal of the carpus on the lateral radiograph is suspicious for subtle triquetral fracture  Alternatively, in this patient with chondrocalcinosis, this could simply represent a fragment of calcified cartilage  Orthopedic clinical  evaluation at follow-up will determine the need for follow-up cross-sectional imaging to distinguish between these 2 possibilities  This examination was marked \"immediate notification\" in Epic in order to begin the standard process by which the radiology reading room liaison alerts the referring practitioner  Workstation performed: [id number]     TRAUMA - CT spine cervical wo contrast    Result Date: 6/4/2023  Impression: No cervical spine fracture or traumatic malalignment  Workstation performed: [id number]     TRAUMA - CT head wo contrast    Result Date: 6/4/2023  Impression: Acute left subdural hematoma measuring up to 9 mm  3 mm left to right shift  I personally discussed this study with Joana Javier on 10:39 a m  Workstation performed: [id number]       EKG, Pathology, and Other Studies: I have personally reviewed pertinent reports  and I have personally reviewed pertinent films in PACS    VTE Prophylaxis: Sequential compression device (Venodyne)     Code Status: Level 1 - Full Code  Advance Directive and Living Will:      Power of :    POLST:      Counseling / Coordination of Care  I spent 20 minutes with the patient    "

## 2023-06-05 NOTE — PROGRESS NOTES
[facility]  Interval Progress Note: Critical Care  Name: Cydney Apgar  MRN: [id number]  Unit/Bed#: ED 18 I Date of Admission: 2023   Date of Service: 2023 I Hospital Day: 0    Interval Events:  76yo lady with traumatic SDH after walking her dog  Also notable to have a R Triqueatral fracture  Seen at [facility] and transferred without complication  Pertinent New Data:   Physical Exam  Vitals and nursing note reviewed  Constitutional:       General: She is not in acute distress  Appearance: She is well-developed  HENT:      Head: Normocephalic  Comments: L perioribtal hematoma    EOM intact    PERRL  Eyes:      Conjunctiva/sclera: Conjunctivae normal    Cardiovascular:      Rate and Rhythm: Normal rate and regular rhythm  Heart sounds: No murmur heard  Pulmonary:      Effort: Pulmonary effort is normal  No respiratory distress  Breath sounds: Normal breath sounds  Abdominal:      Palpations: Abdomen is soft  Tenderness: There is no abdominal tenderness  Musculoskeletal:         General: No swelling  Cervical back: Neck supple  Skin:     General: Skin is warm and dry  Capillary Refill: Capillary refill takes less than 2 seconds  Neurological:      General: No focal deficit present  Mental Status: She is alert  Cranial Nerves: No cranial nerve deficit  Sensory: No sensory deficit  Motor: No weakness  Psychiatric:         Mood and Affect: Mood normal          CTH  Acute left subdural hematoma measuring up to 9 mm  3 mm left to right shift  Remainder of labs and imaging reviewed  Please refer to Trauma H&P for further details of her admission      Assessment and Plan    · Neuro:   · Diagnosis: Acute SDH w/ 3mm Midline Shift   · Admit to SD1  · q1 neuro checks  · Keppra seizure ppx  · NSG consult  · Repeat CTH in AM or if GCS declines >2  · Hold chemical DVT PPx  · Delirium ppx- CAM-ICU, sleep hygeine  · FEN:   · Electrolytes-trend and replete as indicated  · Nutrition- Regular diet  · Msk/Skin:   · Diagnosis: R Triquetral fracture  · Ortho consulted  · Non operative management  · Pain control  · N/v   · PT/OT  · OOB to chair as tolerated    Remainder of systems reviewed with no acute issues    · Disposition: Admit to SD1    SIGNATURE: Lloyd Hamilton MD

## 2023-06-06 ENCOUNTER — APPOINTMENT (INPATIENT)
Dept: RADIOLOGY | Facility: HOSPITAL | Age: 73
DRG: 085 | End: 2023-06-06
Payer: COMMERCIAL

## 2023-06-06 PROBLEM — R50.9 FEVER: Status: ACTIVE | Noted: 2023-06-06
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
## 2023-06-06 LAB
ANION GAP SERPL CALCULATED.3IONS-SCNC: 1 MMOL/L (ref 4–13)
ANION GAP SERPL CALCULATED.3IONS-SCNC: 4 MMOL/L (ref 4–13)
BACTERIA UR QL AUTO: ABNORMAL /HPF
BILIRUB UR QL STRIP: NEGATIVE
BUN SERPL-MCNC: 24 MG/DL (ref 5–25)
BUN SERPL-MCNC: 25 MG/DL (ref 5–25)
CALCIUM SERPL-MCNC: 9 MG/DL (ref 8.3–10.1)
CALCIUM SERPL-MCNC: 9.1 MG/DL (ref 8.3–10.1)
CHLORIDE SERPL-SCNC: 107 MMOL/L (ref 96–108)
CHLORIDE SERPL-SCNC: 108 MMOL/L (ref 96–108)
CLARITY UR: CLEAR
CO2 SERPL-SCNC: 27 MMOL/L (ref 21–32)
CO2 SERPL-SCNC: 28 MMOL/L (ref 21–32)
COLOR UR: YELLOW
CREAT SERPL-MCNC: 0.96 MG/DL (ref 0.6–1.3)
CREAT SERPL-MCNC: 1.13 MG/DL (ref 0.6–1.3)
ERYTHROCYTE [DISTWIDTH] IN BLOOD BY AUTOMATED COUNT: 13.7 % (ref 11.6–15.1)
ERYTHROCYTE [DISTWIDTH] IN BLOOD BY AUTOMATED COUNT: 13.8 % (ref 11.6–15.1)
GFR SERPL CREATININE-BSD FRML MDRD: 48 ML/MIN/1.73SQ M
GFR SERPL CREATININE-BSD FRML MDRD: 58 ML/MIN/1.73SQ M
GLUCOSE SERPL-MCNC: 114 MG/DL (ref 65–140)
GLUCOSE SERPL-MCNC: 146 MG/DL (ref 65–140)
GLUCOSE UR STRIP-MCNC: NEGATIVE MG/DL
HCT VFR BLD AUTO: 35.7 % (ref 34.8–46.1)
HCT VFR BLD AUTO: 37.7 % (ref 34.8–46.1)
HGB BLD-MCNC: 11.8 G/DL (ref 11.5–15.4)
HGB BLD-MCNC: 12.6 G/DL (ref 11.5–15.4)
HGB UR QL STRIP.AUTO: ABNORMAL
KETONES UR STRIP-MCNC: NEGATIVE MG/DL
LACTATE SERPL-SCNC: 1.2 MMOL/L (ref 0.5–2)
LEUKOCYTE ESTERASE UR QL STRIP: NEGATIVE
MCH RBC QN AUTO: 29.8 PG (ref 26.8–34.3)
MCH RBC QN AUTO: 29.8 PG (ref 26.8–34.3)
MCHC RBC AUTO-ENTMCNC: 33.1 G/DL (ref 31.4–37.4)
MCHC RBC AUTO-ENTMCNC: 33.4 G/DL (ref 31.4–37.4)
MCV RBC AUTO: 89 FL (ref 82–98)
MCV RBC AUTO: 90 FL (ref 82–98)
NITRITE UR QL STRIP: NEGATIVE
NON-SQ EPI CELLS URNS QL MICRO: ABNORMAL /HPF
PH UR STRIP.AUTO: 5.5 [PH]
PLATELET # BLD AUTO: 197 THOUSANDS/UL (ref 149–390)
PLATELET # BLD AUTO: 202 THOUSANDS/UL (ref 149–390)
PMV BLD AUTO: 10 FL (ref 8.9–12.7)
PMV BLD AUTO: 10.2 FL (ref 8.9–12.7)
POTASSIUM SERPL-SCNC: 4 MMOL/L (ref 3.5–5.3)
POTASSIUM SERPL-SCNC: 4.5 MMOL/L (ref 3.5–5.3)
PROT UR STRIP-MCNC: ABNORMAL MG/DL
RBC # BLD AUTO: 3.96 MILLION/UL (ref 3.81–5.12)
RBC # BLD AUTO: 4.23 MILLION/UL (ref 3.81–5.12)
RBC #/AREA URNS AUTO: ABNORMAL /HPF
SODIUM SERPL-SCNC: 136 MMOL/L (ref 135–147)
SODIUM SERPL-SCNC: 139 MMOL/L (ref 135–147)
SP GR UR STRIP.AUTO: >=1.03 (ref 1–1.03)
UROBILINOGEN UR STRIP-ACNC: <2 MG/DL
WBC # BLD AUTO: 10.11 THOUSAND/UL (ref 4.31–10.16)
WBC # BLD AUTO: 9.2 THOUSAND/UL (ref 4.31–10.16)
WBC #/AREA URNS AUTO: ABNORMAL /HPF

## 2023-06-06 PROCEDURE — 80048 BASIC METABOLIC PNL TOTAL CA: CPT

## 2023-06-06 PROCEDURE — 71045 X-RAY EXAM CHEST 1 VIEW: CPT

## 2023-06-06 PROCEDURE — 99232 SBSQ HOSP IP/OBS MODERATE 35: CPT | Performed by: PHYSICIAN ASSISTANT

## 2023-06-06 PROCEDURE — 83605 ASSAY OF LACTIC ACID: CPT

## 2023-06-06 PROCEDURE — 87040 BLOOD CULTURE FOR BACTERIA: CPT

## 2023-06-06 PROCEDURE — 85027 COMPLETE CBC AUTOMATED: CPT

## 2023-06-06 PROCEDURE — 99232 SBSQ HOSP IP/OBS MODERATE 35: CPT | Performed by: INTERNAL MEDICINE

## 2023-06-06 PROCEDURE — 81001 URINALYSIS AUTO W/SCOPE: CPT | Performed by: PHYSICIAN ASSISTANT

## 2023-06-06 RX ADMIN — ACETAMINOPHEN 975 MG: 325 TABLET, FILM COATED ORAL at 12:23

## 2023-06-06 RX ADMIN — GABAPENTIN 300 MG: 300 CAPSULE ORAL at 17:33

## 2023-06-06 RX ADMIN — ENOXAPARIN SODIUM 30 MG: 30 INJECTION SUBCUTANEOUS at 08:43

## 2023-06-06 RX ADMIN — ENOXAPARIN SODIUM 30 MG: 30 INJECTION SUBCUTANEOUS at 21:42

## 2023-06-06 RX ADMIN — AMLODIPINE BESYLATE 5 MG: 5 TABLET ORAL at 08:43

## 2023-06-06 RX ADMIN — OXYCODONE HYDROCHLORIDE 5 MG: 5 TABLET ORAL at 17:33

## 2023-06-06 RX ADMIN — CHLORHEXIDINE GLUCONATE 15 ML: 1.2 SOLUTION ORAL at 21:42

## 2023-06-06 RX ADMIN — LEVETIRACETAM 500 MG: 500 TABLET, FILM COATED ORAL at 21:42

## 2023-06-06 RX ADMIN — SENNOSIDES AND DOCUSATE SODIUM 1 TABLET: 50; 8.6 TABLET ORAL at 08:43

## 2023-06-06 RX ADMIN — CHLORHEXIDINE GLUCONATE 15 ML: 1.2 SOLUTION ORAL at 08:43

## 2023-06-06 RX ADMIN — LEVETIRACETAM 500 MG: 500 TABLET, FILM COATED ORAL at 08:43

## 2023-06-06 RX ADMIN — ACETAMINOPHEN 975 MG: 325 TABLET, FILM COATED ORAL at 05:06

## 2023-06-06 RX ADMIN — GABAPENTIN 300 MG: 300 CAPSULE ORAL at 08:43

## 2023-06-06 RX ADMIN — SERTRALINE 50 MG: 50 TABLET, FILM COATED ORAL at 08:43

## 2023-06-06 RX ADMIN — ACETAMINOPHEN 975 MG: 325 TABLET, FILM COATED ORAL at 17:33

## 2023-06-06 NOTE — TELEPHONE ENCOUNTER
06/09/2023-CALLED PT, LEFT MESSAGE ON MACHINE CONFIRMING 06/22/2023 APT IN [place] AND TO SCHEDULE CT HEAD PRIOR TO APT       **WAITING FOR CT HEAD TO BE SCHEDULED**        06/08/2023-PT STILL IN HOSPITAL    06/07/2023-PT STILL IN HOSPITAL    06/06/2023-PT Mariluz Corrigan

## 2023-06-06 NOTE — UTILIZATION REVIEW
Continued Stay Review    Date: 6/6                         Current Patient Class: Inpatient  Current Level of Care: Med Surg    HPI:72 y o  female initially admitted on 6/5    Assessment/Plan: SDH, Triquetral chip fracture, right, Traumatic hematoma of forehead  6/5  Neurosurgery cons; SDH  Continue to monitor neuro exam closely  Continue Keppra  No surgical intervention planned  CT head wo, 6/4/2023: Acute left frontotemporal subdural hematoma with local mass effect is similar to the study from yesterday  6/6  Maintain RUE   Continue Keppra bid for 7 days    Vital Signs:   06/06/23 07:34:39 99 2 °F (37 3 °C) 77 18 132/69 90 94 % -- --   06/06/23 06:08:03 101 °F (38 3 °C)   Abnormal  86 -- 121/67 85 94 % -- --   06/05/23 23:01:08 -- 76 -- 124/70 88 95 % -- --   06/05/23 14:37:53 98 2 °F (36 8 °C) 72 -- 137/83 101 95 % --      Pertinent Labs/Diagnostic Results:   6/5 CT Head - pend      Results from last 7 days   Lab Units 06/06/23  0614 06/05/23  0552 06/04/23  1041   HEMATOCRIT % 37 7 38 5 42 0   HEMOGLOBIN g/dL 12 6 13 0 13 7   NEUTROS ABS Thousands/µL  --   --  6 43   PLATELETS Thousands/uL 202 215 221   WBC Thousand/uL 10 11 7 72 9 03         Results from last 7 days   Lab Units 06/06/23  0614 06/05/23  0552 06/04/23  1041   ANION GAP mmol/L 1* -1* 5   BUN mg/dL 25 21 18   CALCIUM mg/dL 9 0 9 7 10 0   CHLORIDE mmol/L 108 106 106   CO2 mmol/L 27 30 30   CREATININE mg/dL 0 96 1 04 0 82   EGFR ml/min/1 73sq m 58 53 71   POTASSIUM mmol/L 4 5 4 2 4 4   MAGNESIUM mg/dL  --  2 1  --    PHOSPHORUS mg/dL  --  3 7  --    SODIUM mmol/L 136 135 141             Results from last 7 days   Lab Units 06/06/23  0614 06/05/23  0552 06/04/23  1041   GLUCOSE RANDOM mg/dL 114 101 97       Medications:   Scheduled Medications:  acetaminophen, 975 mg, Oral, Q6H POR[facility]  amLODIPine, 5 mg, Oral, Daily  chlorhexidine, 15 mL, Mouth/Throat, Q12H POR[facility]  enoxaparin, 30 mg, Subcutaneous, Q12H [facility]  gabapentin, 300 mg, Oral, BID  levETIRAcetam, 500 mg, Oral, Q12H [facility]  senna-docusate sodium, 1 tablet, Oral, Daily  sertraline, 50 mg, Oral, Daily      Continuous IV Infusions: None     PRN Meds:  labetalol, 20 mg, Intravenous, Q4H PRN  oxyCODONE, 5 mg, Oral, Q4H PRN  oxyCODONE, 2 5 mg, Oral, Q4H PRN        Discharge Plan: TBD    Network Utilization Review Department  ATTENTION: Please call with any questions or concerns to [phone] and carefully listen to the prompts so that you are directed to the right person  All voicemails are confidential   Leticia Espinosa all requests for admission clinical reviews, approved or denied determinations and any other requests to dedicated fax number below belonging to the campus where the patient is receiving treatment   List of dedicated fax numbers for the Facilities:  [address] DENIALS (Administrative/Medical Necessity) [phone]   [address] (Maternity/NICU/Pediatrics) [phone]   0 Catherine Mata [phone]   [facility] 77 [phone]   [address] [place] [address] Jose [facility] Center[facility] 28 [phone]   1553 [facility] Marilee Lopez [facility] 134 815 [facility] [phone]

## 2023-06-06 NOTE — PLAN OF CARE
Problem: Prexisting or High Potential for Compromised Skin Integrity  Goal: Skin integrity is maintained or improved  Description: INTERVENTIONS:  - Identify patients at risk for skin breakdown  - Assess and monitor skin integrity  - Assess and monitor nutrition and hydration status  - Monitor labs   - Assess for incontinence   - Turn and reposition patient  - Assist with mobility/ambulation  - Relieve pressure over bony prominences  - Avoid friction and shearing  - Provide appropriate hygiene as needed including keeping skin clean and dry  - Evaluate need for skin moisturizer/barrier cream  - Collaborate with interdisciplinary team   - Patient/family teaching  - Consider wound care consult   Outcome: Progressing     Problem: MOBILITY - ADULT  Goal: Maintain or return to baseline ADL function  Description: INTERVENTIONS:  -  Assess patient's ability to carry out ADLs; assess patient's baseline for ADL function and identify physical deficits which impact ability to perform ADLs (bathing, care of mouth/teeth, toileting, grooming, dressing, etc )  - Assess/evaluate cause of self-care deficits   - Assess range of motion  - Assess patient's mobility; develop plan if impaired  - Assess patient's need for assistive devices and provide as appropriate  - Encourage maximum independence but intervene and supervise when necessary  - Involve family in performance of ADLs  - Assess for home care needs following discharge   - Consider OT consult to assist with ADL evaluation and planning for discharge  - Provide patient education as appropriate  Outcome: Progressing  Goal: Maintains/Returns to pre admission functional level  Description: INTERVENTIONS:  - Perform BMAT or MOVE assessment daily    - Set and communicate daily mobility goal to care team and patient/family/caregiver  - Collaborate with rehabilitation services on mobility goals if consulted  - Perform Range of Motion  times a day    - Reposition patient every hours   - Dangle patient  times a day  - Stand patient  times a day  - Ambulate patient  times a day  - Out of bed to chair  times a day   - Out of bed for meals  times a day  - Out of bed for toileting  - Record patient progress and toleration of activity level   Outcome: Progressing     Problem: PAIN - ADULT  Goal: Verbalizes/displays adequate comfort level or baseline comfort level  Description: Interventions:  - Encourage patient to monitor pain and request assistance  - Assess pain using appropriate pain scale  - Administer analgesics based on type and severity of pain and evaluate response  - Implement non-pharmacological measures as appropriate and evaluate response  - Consider cultural and social influences on pain and pain management  - Notify physician/advanced practitioner if interventions unsuccessful or patient reports new pain  Outcome: Progressing     Problem: INFECTION - ADULT  Goal: Absence or prevention of progression during hospitalization  Description: INTERVENTIONS:  - Assess and monitor for signs and symptoms of infection  - Monitor lab/diagnostic results  - Monitor all insertion sites, i e  indwelling lines, tubes, and drains  - Monitor endotracheal if appropriate and nasal secretions for changes in amount and color  - [place] appropriate cooling/warming therapies per order  - Administer medications as ordered  - Instruct and encourage patient and family to use good hand hygiene technique  - Identify and instruct in appropriate isolation precautions for identified infection/condition  Outcome: Progressing  Goal: Absence of fever/infection during neutropenic period  Description: INTERVENTIONS:  - Monitor WBC    Outcome: Progressing     Problem: SAFETY ADULT  Goal: Maintain or return to baseline ADL function  Description: INTERVENTIONS:  -  Assess patient's ability to carry out ADLs; assess patient's baseline for ADL function and identify physical deficits which impact ability to perform ADLs (bathing, care of mouth/teeth, toileting, grooming, dressing, etc )  - Assess/evaluate cause of self-care deficits   - Assess range of motion  - Assess patient's mobility; develop plan if impaired  - Assess patient's need for assistive devices and provide as appropriate  - Encourage maximum independence but intervene and supervise when necessary  - Involve family in performance of ADLs  - Assess for home care needs following discharge   - Consider OT consult to assist with ADL evaluation and planning for discharge  - Provide patient education as appropriate  Outcome: Progressing  Goal: Maintains/Returns to pre admission functional level  Description: INTERVENTIONS:  - Perform BMAT or MOVE assessment daily    - Set and communicate daily mobility goal to care team and patient/family/caregiver  - Collaborate with rehabilitation services on mobility goals if consulted  - Perform Range of Motion  times a day  - Reposition patient every  hours    - Dangle patient  times a day  - Stand patient  times a day  - Ambulate patient  times a day  - Out of bed to chair  times a day   - Out of bed for meals  times a day  - Out of bed for toileting  - Record patient progress and toleration of activity level   Outcome: Progressing  Goal: Patient will remain free of falls  Description: INTERVENTIONS:  - Educate patient/family on patient safety including physical limitations  - Instruct patient to call for assistance with activity   - Consult OT/PT to assist with strengthening/mobility   - Keep Call bell within reach  - Keep bed low and locked with side rails adjusted as appropriate  - Keep care items and personal belongings within reach  - Initiate and maintain comfort rounds  - Make Fall Risk Sign visible to staff  - Offer Toileting every  Hours, in advance of need  - Initiate/Maintain alarm  - Obtain necessary fall risk management equipment:   - Apply yellow socks and bracelet for high fall risk patients  - Consider moving patient to room near nurses station  Outcome: Progressing     Problem: DISCHARGE PLANNING  Goal: Discharge to home or other facility with appropriate resources  Description: INTERVENTIONS:  - Identify barriers to discharge w/patient and caregiver  - Arrange for needed discharge resources and transportation as appropriate  - Identify discharge learning needs (meds, wound care, etc )  - Arrange for interpretive services to assist at discharge as needed  - Refer to Case Management Department for coordinating discharge planning if the patient needs post-hospital services based on physician/advanced practitioner order or complex needs related to functional status, cognitive ability, or social support system  Outcome: Progressing     Problem: Knowledge Deficit  Goal: Patient/family/caregiver demonstrates understanding of disease process, treatment plan, medications, and discharge instructions  Description: Complete learning assessment and assess knowledge base    Interventions:  - Provide teaching at level of understanding  - Provide teaching via preferred learning methods  Outcome: Progressing     Problem: Potential for Falls  Goal: Patient will remain free of falls  Description: INTERVENTIONS:  - Educate patient/family on patient safety including physical limitations  - Instruct patient to call for assistance with activity   - Consult OT/PT to assist with strengthening/mobility   - Keep Call bell within reach  - Keep bed low and locked with side rails adjusted as appropriate  - Keep care items and personal belongings within reach  - Initiate and maintain comfort rounds  - Make Fall Risk Sign visible to staff  - Offer Toileting every  Hours, in advance of need  - Initiate/Maintain alarm  - Obtain necessary fall risk management equipment  - Apply yellow socks and bracelet for high fall risk patients  - Consider moving patient to room near nurses station  Outcome: Progressing

## 2023-06-06 NOTE — ASSESSMENT & PLAN NOTE
- fever this AM and in afternoon  - Will continue to monitor  - can consider septic work-up if continues  - UA ordered

## 2023-06-06 NOTE — PROGRESS NOTES
[address] [facility]  Progress Note  Name: Lakeshia Murray  MRN: [id number]  Unit/Bed#: [facility] 610-01 I Date of Admission: 6/4/2023   Date of Service: 6/6/2023 I Hospital Day: 2    Assessment/Plan   Triquetral chip fracture, right, closed, initial encounter  Assessment & Plan  - Right triquetral fracture, present on admission   - Appreciate Orthopedic surgery evaluation, recommendations and interventions as noted  - Maintain non weightbearing status on the right upper extremity in volar splint   - Monitor right upper extremity neurovascular exam   - Continue multimodal analgesic regimen   - Continue DVT prophylaxis  - PT and OT evaluation and treatment as indicated  - Outpatient follow up with Orthopedic surgery for re-evaluation  Traumatic hematoma of forehead  Assessment & Plan  - secondary to fall  - local wound care  - last tetanus 2/6/23    Mood disorder ([facility] [address] )  Assessment & Plan  - Continue current medication regimen   - Outpatient follow-up with PCP  Hypertension  Assessment & Plan  - Continue current medication regimen   - Outpatient follow-up with PCP  Hyperlipidemia  Assessment & Plan  - Continue current medication regimen   - Outpatient follow-up with PCP  * Subdural hematoma (HCC)  Assessment & Plan  - secondary to mechanical fall  - 6/4 CT head- Acute left subdural hematoma measuring up to 9 mm  3 mm left to right shift  - patient denies any antiplatelet or anticoagulant use  - 500 mg keppra BID for 7 days  - neurosurgery consult -- signed off; outpatient follow-up in two weeks  - GCS 15 with no focal deficits  - PT/OT and CM consult for disposition planning       DVT prophylaxis: SCDs and Lovenox  PT and OT: eval and treat    Disposition: D/C tomorrow on 6/7/23 if fever curve remains stable  Follow-up       TRAUMA TERTIARY SURVEY NOTE    Code status:  Level 1 - Full Code    Consultants: IP CONSULT TO NEUROSURGERY  IP CONSULT TO CASE MANAGEMENT  IP CONSULT TO GERONTOLOGY  IP CONSULT TO ORTHOPEDIC SURGERY    Subjective   Transfer from: [facility]    Mechanism of Injury:Fall     Chief Complaint: No new complaints    HPI/Last 24 hour events: Patient is denying any new complaints and on presentation  Currently doing well  Denying any new nausea or vomiting  No chest pain or shortness of breath  No new numbness or tingling on extremities  No new headaches  No blurry vision or double vision  No hearing deficits  Objective   Vitals:   Temp:  [98 2 °F (36 8 °C)-101 °F (38 3 °C)] 99 2 °F (37 3 °C)  HR:  [70-86] 77  Resp:  [15-30] 18  BP: (103-137)/(61-83) 132/69    I/O       06/04 0701  06/05 0700 06/05 0701  06/06 0700 06/06 0701  06/07 0700    P  O  120 600     Total Intake(mL/kg) 120 (1 7) 600 (8 4)     Urine (mL/kg/hr) 500      Total Output 500      Net -380 +600                   Physical Exam:   GENERAL APPEARANCE: No acute distress  NEURO: GCS 15  HEENT: Left periorbital swelling and ecchymosis; stable  CV: Regular rate and rhythm  LUNGS: CTA bilaterally  GI: Nontender, nondistended  : No [place]  MSK: +2 pulses on extremities  SKIN: Warm, dry, intact    Invasive Devices     Peripheral Intravenous Line  Duration           Peripheral IV 06/05/23 Left;Ventral (anterior) Forearm 1 day                   1  Before the illness or injury that brought you to the Emergency, did you need someone to help you on a regular basis? 0=No   2  Since the illness or injury that brought you to the Emergency, have you needed more help than usual to take care of yourself? 1=Yes   3  Have you been hospitalized for one or more nights during the past 6 months (excluding a stay in the Emergency Department)? 0=No   4  In general, do you see well? 0=Yes   5  In general, do you have serious problems with your memory? 0=No   6  Do you take more than three different medications everyday?  1=Yes   TOTAL   2     Did you order a geriatric consult if the score was 2 or greater?: yes Lab Results:   Results: I have personally reviewed all pertinent laboratory/tests results, BMP/CMP:   Lab Results   Component Value Date    BUN 25 06/06/2023    CALCIUM 9 0 06/06/2023     06/06/2023    CO2 27 06/06/2023    CREATININE 0 96 06/06/2023    EGFR 58 06/06/2023    K 4 5 06/06/2023    SODIUM 136 06/06/2023    and CBC:   Lab Results   Component Value Date    HCT 37 7 06/06/2023    HGB 12 6 06/06/2023    MCH 29 8 06/06/2023    MCHC 33 4 06/06/2023    MCV 89 06/06/2023    MPV 10 2 06/06/2023     06/06/2023    RBC 4 23 06/06/2023    RDW 13 8 06/06/2023    WBC 10 11 06/06/2023       Imaging Results: I have personally reviewed pertinent reports      Chest Xray(s): negative for acute findings   FAST exam(s): negative for acute findings   CT Scan(s): positive for acute findings: Traumatic brain injury, triquetral fracture   Additional Xray(s): negative for acute findings     Other Studies: No other studies

## 2023-06-06 NOTE — PLAN OF CARE
Problem: Prexisting or High Potential for Compromised Skin Integrity  Goal: Skin integrity is maintained or improved  Description: INTERVENTIONS:  - Identify patients at risk for skin breakdown  - Assess and monitor skin integrity  - Assess and monitor nutrition and hydration status  - Monitor labs   - Assess for incontinence   - Turn and reposition patient  - Assist with mobility/ambulation  - Relieve pressure over bony prominences  - Avoid friction and shearing  - Provide appropriate hygiene as needed including keeping skin clean and dry  - Evaluate need for skin moisturizer/barrier cream  - Collaborate with interdisciplinary team   - Patient/family teaching  - Consider wound care consult   Outcome: Progressing     Problem: MOBILITY - ADULT  Goal: Maintain or return to baseline ADL function  Description: INTERVENTIONS:  -  Assess patient's ability to carry out ADLs; assess patient's baseline for ADL function and identify physical deficits which impact ability to perform ADLs (bathing, care of mouth/teeth, toileting, grooming, dressing, etc )  - Assess/evaluate cause of self-care deficits   - Assess range of motion  - Assess patient's mobility; develop plan if impaired  - Assess patient's need for assistive devices and provide as appropriate  - Encourage maximum independence but intervene and supervise when necessary  - Involve family in performance of ADLs  - Assess for home care needs following discharge   - Consider OT consult to assist with ADL evaluation and planning for discharge  - Provide patient education as appropriate  Outcome: Progressing  Goal: Maintains/Returns to pre admission functional level  Description: INTERVENTIONS:  - Perform BMAT or MOVE assessment daily    - Set and communicate daily mobility goal to care team and patient/family/caregiver  - Collaborate with rehabilitation services on mobility goals if consulted  - Perform Range of Motion  times a day    - Reposition patient every hours   - Dangle patient  times a day  - Stand patient  times a day  - Ambulate patient times a day  - Out of bed to chair  times a day   - Out of bed for meals times a day  - Out of bed for toileting  - Record patient progress and toleration of activity level   Outcome: Progressing     Problem: PAIN - ADULT  Goal: Verbalizes/displays adequate comfort level or baseline comfort level  Description: Interventions:  - Encourage patient to monitor pain and request assistance  - Assess pain using appropriate pain scale  - Administer analgesics based on type and severity of pain and evaluate response  - Implement non-pharmacological measures as appropriate and evaluate response  - Consider cultural and social influences on pain and pain management  - Notify physician/advanced practitioner if interventions unsuccessful or patient reports new pain  Outcome: Progressing     Problem: INFECTION - ADULT  Goal: Absence or prevention of progression during hospitalization  Description: INTERVENTIONS:  - Assess and monitor for signs and symptoms of infection  - Monitor lab/diagnostic results  - Monitor all insertion sites, i e  indwelling lines, tubes, and drains  - Monitor endotracheal if appropriate and nasal secretions for changes in amount and color  - [place] appropriate cooling/warming therapies per order  - Administer medications as ordered  - Instruct and encourage patient and family to use good hand hygiene technique  - Identify and instruct in appropriate isolation precautions for identified infection/condition  Outcome: Progressing  Goal: Absence of fever/infection during neutropenic period  Description: INTERVENTIONS:  - Monitor WBC    Outcome: Progressing     Problem: SAFETY ADULT  Goal: Maintain or return to baseline ADL function  Description: INTERVENTIONS:  -  Assess patient's ability to carry out ADLs; assess patient's baseline for ADL function and identify physical deficits which impact ability to perform ADLs (bathing, care of mouth/teeth, toileting, grooming, dressing, etc )  - Assess/evaluate cause of self-care deficits   - Assess range of motion  - Assess patient's mobility; develop plan if impaired  - Assess patient's need for assistive devices and provide as appropriate  - Encourage maximum independence but intervene and supervise when necessary  - Involve family in performance of ADLs  - Assess for home care needs following discharge   - Consider OT consult to assist with ADL evaluation and planning for discharge  - Provide patient education as appropriate  Outcome: Progressing  Goal: Maintains/Returns to pre admission functional level  Description: INTERVENTIONS:  - Perform BMAT or MOVE assessment daily    - Set and communicate daily mobility goal to care team and patient/family/caregiver  - Collaborate with rehabilitation services on mobility goals if consulted  - Perform Range of Motion  times a day  - Reposition patient every  hours    - Dangle patient  times a day  - Stand patient times a day  - Ambulate patient  times a day  - Out of bed to chair  times a day   - Out of bed for meals times a day  - Out of bed for toileting  - Record patient progress and toleration of activity level   Outcome: Progressing  Goal: Patient will remain free of falls  Description: INTERVENTIONS:  - Educate patient/family on patient safety including physical limitations  - Instruct patient to call for assistance with activity   - Consult OT/PT to assist with strengthening/mobility   - Keep Call bell within reach  - Keep bed low and locked with side rails adjusted as appropriate  - Keep care items and personal belongings within reach  - Initiate and maintain comfort rounds  - Make Fall Risk Sign visible to staff  - Offer Toileting every  Hours, in advance of need  - Initiate/Maintainalarm  - Obtain necessary fall risk management equipment  - Apply yellow socks and bracelet for high fall risk patients  - Consider moving patient to room near nurses station  Outcome: Progressing     Problem: DISCHARGE PLANNING  Goal: Discharge to home or other facility with appropriate resources  Description: INTERVENTIONS:  - Identify barriers to discharge w/patient and caregiver  - Arrange for needed discharge resources and transportation as appropriate  - Identify discharge learning needs (meds, wound care, etc )  - Arrange for interpretive services to assist at discharge as needed  - Refer to Case Management Department for coordinating discharge planning if the patient needs post-hospital services based on physician/advanced practitioner order or complex needs related to functional status, cognitive ability, or social support system  Outcome: Progressing     Problem: Knowledge Deficit  Goal: Patient/family/caregiver demonstrates understanding of disease process, treatment plan, medications, and discharge instructions  Description: Complete learning assessment and assess knowledge base    Interventions:  - Provide teaching at level of understanding  - Provide teaching via preferred learning methods  Outcome: Progressing     Problem: Potential for Falls  Goal: Patient will remain free of falls  Description: INTERVENTIONS:  - Educate patient/family on patient safety including physical limitations  - Instruct patient to call for assistance with activity   - Consult OT/PT to assist with strengthening/mobility   - Keep Call bell within reach  - Keep bed low and locked with side rails adjusted as appropriate  - Keep care items and personal belongings within reach  - Initiate and maintain comfort rounds  - Make Fall Risk Sign visible to staff  - Offer Toileting every  Hours, in advance of need  - Initiate/Maintain alarm  - Obtain necessary fall risk management equipment:   - Apply yellow socks and bracelet for high fall risk patients  - Consider moving patient to room near nurses station  Outcome: Progressing

## 2023-06-06 NOTE — PROGRESS NOTES
Progress Note - Geriatric Medicine   Izabela Soliman 67 y o  female MRN: [id number]  Unit/Bed#: [facility] 610-01 Encounter: [id number]      Assessment/Plan:     Ambulatory dysfunction with fall  -reportedly mechanical fall 6/4/23  -(+) head strike (-) loss of consciousness  -injuries as outlined below  -no assist device at baseline and denies hx recurrent falls   -cont fall precautions and assist w tx   -PT and OT following      SDH  -s/p fall as outlined above  -CTH 6/4 reports acute left subdural hematoma measuring 9 mm with 3 mm left-to-right shift   -CTH 6/5 reports acute left frontotemporal subdural hematoma with local mass effect similar to study from admission  -AC/AP on hold   -neurochecks per protocol  -Nsx on consult - recommend close o/p f/u     Right dorsal triquetral fracture  -NWB in splint  -Neurovascular checks per protocol  -acute pain control   -non-operative management per Ortho      Acute pain due to trauma   -continue acute multimodal pain control per Geriatric pain protocol  -consider adjuncts such as lidocaine patch topically to appropriate areas   -encourage addition of non-pharmacologic pain treatment including ice and frequent repositioning  -recommend  bowel regimen to prevent and treat constipation due to increased risk with acute pain and opiate pain medications     Depression  -symptoms well controlled on home Zoloft regimen, continue current dosing  -consider o/p referral to counseling/support group as part of comprehensive multimodal treatment approach  -Continue psychosocial supports and cares     Impaired Vision  -recommend use of corrective lenses at all appropriate times  -consider large font for printed materials provided to patient      Cognitive screening  -alert and fully oriented, denies memory or cognitive concerns   -Independent with ADLs and IADLs at baseline  -No prior cognitive testing on record for review  -CTH on admission viewed, in addition to acute L SDH, at least mild chronic microangiopathic changes also noted   -No recent TSH or B12 on record for review, consider checking with routine labs   -Encourage patient remain physically, socially, and cognitively active and engaged to maintain cognitive acuity     Frailty syndrome in geriatric patient   -clinical frailty scale stage IV, vulnerable   -multifactorial including age, HTN and co-morbidities now with fall and acute SDH superimposed   -encourage well balanced nutrition  -continue optimization chronic conditions and address acute derangements as arise  -continue psychosocial supports, appreciate CM assist with contact info for patient to local community based service programs      High risk developing delirium   -Patient is high risk of delirium due to age, fall, SDH, ICU admission   -Initiate delirium precautions  -maintain normal sleep/wake cycle  -minimize overnight interruptions, group overnight vitals/labs/nursing checks as possible  -dim lights, close blinds and turn off tv to minimize stimulation and encourage sleep environment in evenings  -ensure that pain is well controlled  -monitor for fecal and urinary retention   -provide frequent reorientation and redirection as indicated and appropriate  -minimize use of medications which may precipitate or worsen delirium     Care coordination: rounded with Jorge (Trauma AP)    Subjective:     Bhaskar Acosta is seen and examined at bedside where she is sitting resting, she slept well overnight, she reports pain in her right wrist and generalized body aches otherwise feeling well and offers no acute complaints  Review of Systems   Constitutional: Positive for fatigue  Negative for appetite change, chills and fever  HENT: Negative  Eyes: Negative  Respiratory: Negative  Cardiovascular: Negative  Gastrointestinal: Positive for nausea (episode earlier this morning self resolved)  Genitourinary: Negative      Musculoskeletal:        Generalized body aches with more "localized in right wrist    Skin: Negative  Neurological: Negative  Negative for dizziness, light-headedness and headaches  Hematological: Negative  Psychiatric/Behavioral: Negative  Negative for sleep disturbance  All other systems reviewed and are negative  Objective:     Vitals: Blood pressure 132/69, pulse 77, temperature 99 2 °F (37 3 °C), resp  rate 18, height 5' 6\" (1 676 m), weight 71 6 kg (157 lb 13 6 oz), SpO2 94 %  ,Body mass index is 25 48 kg/m²  Intake/Output Summary (Last 24 hours) at 6/6/2023 1014  Last data filed at 6/5/2023 1246  Gross per 24 hour   Intake 120 ml   Output --   Net 120 ml     Current Medications: Reviewed    Physical Exam:   Physical Exam  Vitals and nursing note reviewed  Constitutional:       General: She is not in acute distress  Appearance: Normal appearance  HENT:      Head: Normocephalic  Comments: L periorbital ecchymosis      Nose: Nose normal       Mouth/Throat:      Mouth: Mucous membranes are dry  Eyes:      General:         Right eye: No discharge  Left eye: No discharge  Neck:      Comments: Trachea midline, phonation normal  Cardiovascular:      Rate and Rhythm: Normal rate  Pulmonary:      Effort: Pulmonary effort is normal  No respiratory distress  Breath sounds: No wheezing  Abdominal:      General: There is no distension  Palpations: Abdomen is soft  Tenderness: There is no abdominal tenderness  Musculoskeletal:      Cervical back: Neck supple  Right lower leg: No edema  Left lower leg: No edema  Comments: RUE in splint    Skin:     General: Skin is warm and dry  Coloration: Skin is pale  Neurological:      Mental Status: She is alert        Comments: Awake and alert, oriented, answers questions appropriately, speech clear and fluent    Psychiatric:         Mood and Affect: Mood normal          Behavior: Behavior normal         Invasive Devices     Peripheral Intravenous Line  " Duration           Peripheral IV 06/05/23 Left;Ventral (anterior) Forearm 1 day              Lab, Imaging and other studies: I have personally reviewed pertinent reports

## 2023-06-07 ENCOUNTER — APPOINTMENT (INPATIENT)
Dept: RADIOLOGY | Facility: HOSPITAL | Age: 73
DRG: 085 | End: 2023-06-07
Payer: COMMERCIAL

## 2023-06-07 LAB
ANION GAP SERPL CALCULATED.3IONS-SCNC: -1 MMOL/L (ref 4–13)
ATRIAL RATE: 65 BPM
BASOPHILS # BLD AUTO: 0.05 THOUSANDS/ÂΜL (ref 0–0.1)
BASOPHILS NFR BLD AUTO: 1 % (ref 0–1)
BUN SERPL-MCNC: 22 MG/DL (ref 5–25)
CALCIUM SERPL-MCNC: 9.6 MG/DL (ref 8.3–10.1)
CHLORIDE SERPL-SCNC: 109 MMOL/L (ref 96–108)
CO2 SERPL-SCNC: 30 MMOL/L (ref 21–32)
CREAT SERPL-MCNC: 0.81 MG/DL (ref 0.6–1.3)
EOSINOPHIL # BLD AUTO: 0.1 THOUSAND/ÂΜL (ref 0–0.61)
EOSINOPHIL NFR BLD AUTO: 1 % (ref 0–6)
ERYTHROCYTE [DISTWIDTH] IN BLOOD BY AUTOMATED COUNT: 13.6 % (ref 11.6–15.1)
GFR SERPL CREATININE-BSD FRML MDRD: 72 ML/MIN/1.73SQ M
GLUCOSE SERPL-MCNC: 104 MG/DL (ref 65–140)
HCT VFR BLD AUTO: 36.4 % (ref 34.8–46.1)
HGB BLD-MCNC: 12.1 G/DL (ref 11.5–15.4)
IMM GRANULOCYTES # BLD AUTO: 0.03 THOUSAND/UL (ref 0–0.2)
IMM GRANULOCYTES NFR BLD AUTO: 0 % (ref 0–2)
LYMPHOCYTES # BLD AUTO: 1.84 THOUSANDS/ÂΜL (ref 0.6–4.47)
LYMPHOCYTES NFR BLD AUTO: 24 % (ref 14–44)
MAGNESIUM SERPL-MCNC: 2 MG/DL (ref 1.6–2.6)
MCH RBC QN AUTO: 30 PG (ref 26.8–34.3)
MCHC RBC AUTO-ENTMCNC: 33.2 G/DL (ref 31.4–37.4)
MCV RBC AUTO: 90 FL (ref 82–98)
MONOCYTES # BLD AUTO: 0.71 THOUSAND/ÂΜL (ref 0.17–1.22)
MONOCYTES NFR BLD AUTO: 9 % (ref 4–12)
NEUTROPHILS # BLD AUTO: 5.03 THOUSANDS/ÂΜL (ref 1.85–7.62)
NEUTS SEG NFR BLD AUTO: 65 % (ref 43–75)
NRBC BLD AUTO-RTO: 0 /100 WBCS
P AXIS: 54 DEGREES
PHOSPHATE SERPL-MCNC: 2.8 MG/DL (ref 2.3–4.1)
PLATELET # BLD AUTO: 184 THOUSANDS/UL (ref 149–390)
PMV BLD AUTO: 10.4 FL (ref 8.9–12.7)
POTASSIUM SERPL-SCNC: 4.3 MMOL/L (ref 3.5–5.3)
PR INTERVAL: 158 MS
QRS AXIS: 9 DEGREES
QRSD INTERVAL: 88 MS
QT INTERVAL: 414 MS
QTC INTERVAL: 430 MS
RBC # BLD AUTO: 4.03 MILLION/UL (ref 3.81–5.12)
SODIUM SERPL-SCNC: 138 MMOL/L (ref 135–147)
T WAVE AXIS: 18 DEGREES
VENTRICULAR RATE: 65 BPM
WBC # BLD AUTO: 7.76 THOUSAND/UL (ref 4.31–10.16)

## 2023-06-07 PROCEDURE — 83735 ASSAY OF MAGNESIUM: CPT | Performed by: PHYSICIAN ASSISTANT

## 2023-06-07 PROCEDURE — 84100 ASSAY OF PHOSPHORUS: CPT | Performed by: PHYSICIAN ASSISTANT

## 2023-06-07 PROCEDURE — 93010 ELECTROCARDIOGRAM REPORT: CPT | Performed by: INTERNAL MEDICINE

## 2023-06-07 PROCEDURE — 80048 BASIC METABOLIC PNL TOTAL CA: CPT | Performed by: PHYSICIAN ASSISTANT

## 2023-06-07 PROCEDURE — 73130 X-RAY EXAM OF HAND: CPT

## 2023-06-07 PROCEDURE — 85025 COMPLETE CBC W/AUTO DIFF WBC: CPT | Performed by: PHYSICIAN ASSISTANT

## 2023-06-07 PROCEDURE — 99232 SBSQ HOSP IP/OBS MODERATE 35: CPT | Performed by: INTERNAL MEDICINE

## 2023-06-07 PROCEDURE — 99232 SBSQ HOSP IP/OBS MODERATE 35: CPT | Performed by: STUDENT IN AN ORGANIZED HEALTH CARE EDUCATION/TRAINING PROGRAM

## 2023-06-07 RX ORDER — POLYETHYLENE GLYCOL 3350 17 G/17G
17 POWDER, FOR SOLUTION ORAL DAILY
Status: DISCONTINUED | OUTPATIENT
Start: 2023-06-07 | End: 2023-06-08 | Stop reason: HOSPADM

## 2023-06-07 RX ADMIN — OXYCODONE HYDROCHLORIDE 5 MG: 5 TABLET ORAL at 15:21

## 2023-06-07 RX ADMIN — GABAPENTIN 300 MG: 300 CAPSULE ORAL at 08:40

## 2023-06-07 RX ADMIN — ACETAMINOPHEN 975 MG: 325 TABLET, FILM COATED ORAL at 00:17

## 2023-06-07 RX ADMIN — SENNOSIDES AND DOCUSATE SODIUM 1 TABLET: 50; 8.6 TABLET ORAL at 08:40

## 2023-06-07 RX ADMIN — LEVETIRACETAM 500 MG: 500 TABLET, FILM COATED ORAL at 22:23

## 2023-06-07 RX ADMIN — POLYETHYLENE GLYCOL 3350 17 G: 17 POWDER, FOR SOLUTION ORAL at 11:28

## 2023-06-07 RX ADMIN — ENOXAPARIN SODIUM 30 MG: 30 INJECTION SUBCUTANEOUS at 22:23

## 2023-06-07 RX ADMIN — ACETAMINOPHEN 975 MG: 325 TABLET, FILM COATED ORAL at 05:22

## 2023-06-07 RX ADMIN — ACETAMINOPHEN 975 MG: 325 TABLET, FILM COATED ORAL at 22:22

## 2023-06-07 RX ADMIN — LEVETIRACETAM 500 MG: 500 TABLET, FILM COATED ORAL at 08:40

## 2023-06-07 RX ADMIN — CHLORHEXIDINE GLUCONATE 15 ML: 1.2 SOLUTION ORAL at 08:39

## 2023-06-07 RX ADMIN — AMLODIPINE BESYLATE 5 MG: 5 TABLET ORAL at 08:40

## 2023-06-07 RX ADMIN — ACETAMINOPHEN 975 MG: 325 TABLET, FILM COATED ORAL at 11:20

## 2023-06-07 RX ADMIN — ENOXAPARIN SODIUM 30 MG: 30 INJECTION SUBCUTANEOUS at 08:40

## 2023-06-07 RX ADMIN — SERTRALINE 50 MG: 50 TABLET, FILM COATED ORAL at 08:40

## 2023-06-07 NOTE — PROGRESS NOTES
"[address]  Progress Note  Name: Jane Patel  MRN: [id number]  Unit/Bed#: [facility] 610-01 I Date of Admission: 6/4/2023   Date of Service: 6/7/2023 I Hospital Day: 3    Assessment/Plan   Triquetral chip fracture, right, closed, initial encounter  Assessment & Plan  - Right triquetral fracture, present on admission   - Appreciate Orthopedic surgery evaluation, recommendations and interventions as noted  - Maintain non weightbearing status on the right upper extremity in volar splint   - Monitor right upper extremity neurovascular exam   - Continue multimodal analgesic regimen   - Continue DVT prophylaxis  - PT and OT evaluation and treatment as indicated  - Outpatient follow up with Orthopedic surgery for re-evaluation  Traumatic hematoma of forehead  Assessment & Plan  - secondary to fall  - local wound care  - last tetanus 2/6/23    Mood disorder ([facility] [address] )  Assessment & Plan  - Continue current medication regimen   - Outpatient follow-up with PCP  Hypertension  Assessment & Plan  - Continue current medication regimen   - Outpatient follow-up with PCP  Hyperlipidemia  Assessment & Plan  - Continue current medication regimen   - Outpatient follow-up with PCP  * Subdural hematoma (HCC)  Assessment & Plan  - secondary to mechanical fall  - 6/4 CT head- Acute left subdural hematoma measuring up to 9 mm  3 mm left to right shift    - patient denies any antiplatelet or anticoagulant use  - 500 mg keppra BID for 7 days  - neurosurgery consult -- signed off; outpatient follow-up in two weeks  - GCS 15 with no focal deficits  - PT/OT and CM consult for disposition planning             Bowel Regimen: Senna and Colace  VTE Prophylaxis:Sequential compression device (Venodyne)  and Enoxaparin (Lovenox)     Disposition: home    Subjective   Chief Complaint: left knee is sore    Subjective: \" I am doing pretty good\"     Objective   Vitals:   Temp:  [98 4 °F (36 9 " °C)-100 6 °F (38 1 °C)] 98 7 °F (37 1 °C)  HR:  [73-89] 73  Resp:  [16-18] 18  BP: (117-135)/(65-71) 135/71    I/O       06/05 0701  06/06 0700 06/06 0701  06/07 0700 06/07 0701  06/08 0700    P  O  600 1100     Total Intake(mL/kg) 600 (8 4) 1100 (15 6)     Urine (mL/kg/hr)  575 (0 3)     Total Output  575     Net +600 +525                   Physical Exam:   GENERAL APPEARANCE: comfortable  NEURO: intact, GCS - 15  HEENT: EOm's intact  CV: RRR< no complaints of chest pain  LUNGS: CTA bilaterally, no shortness of breath  GI: tolerating a diet  : voiding  MSK: moving extremities  SKIN: warm and dry    Invasive Devices     Peripheral Intravenous Line  Duration           Peripheral IV 06/05/23 Left;Ventral (anterior) Forearm 2 days                      Lab Results:    Latest Reference Range & Units 06/07/23 04:57   Sodium 135 - 147 mmol/L 138   Potassium 3 5 - 5 3 mmol/L 4 3   Chloride 96 - 108 mmol/L 109 (H)   CO2 21 - 32 mmol/L 30   Anion Gap 4 - 13 mmol/L -1 (L)   BUN 5 - 25 mg/dL 22   Creatinine 0 60 - 1 30 mg/dL 0 81   Glucose, Random 65 - 140 mg/dL 104   Calcium 8 3 - 10 1 mg/dL 9 6   eGFR ml/min/1 73sq m 72   Phosphorus 2 3 - 4 1 mg/dL 2 8   Magnesium 1 6 - 2 6 mg/dL 2 0   WBC 4 31 - 10 16 Thousand/uL 7 76   Red Blood Cell Count 3 81 - 5 12 Million/uL 4 03   Hemoglobin 11 5 - 15 4 g/dL 12 1   HCT 34 8 - 46 1 % 36 4   MCV 82 - 98 fL 90   MCH 26 8 - 34 3 pg 30 0   MCHC 31 4 - 37 4 g/dL 33 2   RDW 11 6 - 15 1 % 13 6   Platelet Count 956 - 390 Thousands/uL 184   MPV 8 9 - 12 7 fL 10 4   nRBC /100 WBCs 0   Neutrophils % 43 - 75 % 65   Immat GRANS % 0 - 2 % 0   Lymphocytes Relative 14 - 44 % 24   Monocytes Relative 4 - 12 % 9   Eosinophils 0 - 6 % 1   Basophils Relative 0 - 1 % 1   Immature Grans Absolute 0 00 - 0 20 Thousand/uL 0 03   (H): Data is abnormally high  (L): Data is abnormally low  Imaging: Left Knee - neg  Other Studies: none

## 2023-06-07 NOTE — PROGRESS NOTES
Progress Note - Geriatric Medicine   Pawan Grimm 67 y o  female MRN: [id number]  Unit/Bed#: [facility] 610-01 Encounter: [id number]      Assessment/Plan:    Ambulatory dysfunction with fall  -reportedly mechanical fall 6/4/23  -injuries as outlined below  -no assist device at baseline and denies hx recurrent falls   -high risk future falls - cont fall precautions and assist w tx   -PT and OT following      SDH  -s/p fall as outlined above  -CTH 6/4 reports acute left subdural hematoma measuring 9 mm with 3 mm left-to-right shift   -CTH 6/5 reports acute left frontotemporal subdural hematoma with local mass effect similar to study from admission  -systemic AC/AP on hold, dvt ppx w Lovenox   -neurochecks per protocol  -Nsx on consult - recommend close o/p f/u     Right dorsal triquetral fracture  -NWB in splint  -Neurovascular checks per protocol  -acute pain control   -non-operative management per Ortho      Acute pain due to trauma   -continue acute multimodal pain control per Geriatric pain protocol  -consider adjuncts such as lidocaine patch topically to appropriate areas   -encourage addition of non-pharmacologic pain treatment including ice and frequent repositioning  -increase bowel regimen to treat constipation, pt reports miralax usually helpful for her, consider supp tomorrow if no response to miralax     Depression  -symptoms well controlled on home Zoloft regimen, continue current dosing  -consider o/p referral to counseling/support group as part of comprehensive multimodal treatment approach  -Continue psychosocial supports and cares     Impaired Vision  -recommend use of corrective lenses at all appropriate times  -consider large font for printed materials provided to patient      Cognitive screening  -alert and fully oriented, denies memory or cognitive concerns   -Independent with ADLs and IADLs at baseline  -No prior cognitive testing on record for review  -CTH on admission viewed, in addition to acute L SDH, at least mild chronic microangiopathic changes also noted   -No recent TSH or B12 on record for review, consider checking with routine labs   -Encourage patient remain physically, socially, and cognitively active and engaged to maintain cognitive acuity     Frailty syndrome in geriatric patient   -clinical frailty scale stage IV, vulnerable   -multifactorial including age, HTN and co-morbidities now with fall and acute SDH superimposed   -encourage well balanced nutrition  -continue optimization chronic conditions and address acute derangements as arise  -continue psychosocial supports, appreciate CM assist with contact info for patient to local community based service programs      High risk developing delirium   -Initiate delirium precautions  -maintain normal sleep/wake cycle  -ensure that pain is well controlled  -monitor for fecal and urinary retention   -provide frequent reorientation and redirection as indicated and appropriate    Care coordination: rounded with Chacho Frias (RN)    Subjective:     Miguel Castaneda is seen and examined at bedside where she is lying resting, she feels cognitively foggy today and especially worn out with general body aches  She is now starting to have pain in her left hand  She is constipated and had just taken miralax and is hopeful for quick onset of action as she feels uncomfortable and backed up  Review of Systems   Constitutional: Positive for appetite change (poor) and fatigue (with general malaise)  Negative for chills and fever  HENT: Negative  Eyes: Negative  Respiratory: Negative  Cardiovascular: Negative  Gastrointestinal: Positive for constipation  Abd fullness and discomfort    Genitourinary: Negative  Musculoskeletal: Positive for arthralgias and myalgias  Skin: Negative  Neurological: Negative for dizziness, light-headedness and headaches  Feels mentally foggy today   Hematological: Negative      Psychiatric/Behavioral: Positive "for decreased concentration  Negative for sleep disturbance  All other systems reviewed and are negative  Objective:     Vitals: Blood pressure 135/71, pulse 73, temperature 98 7 °F (37 1 °C), resp  rate 18, height 5' 6\" (1 676 m), weight 70 6 kg (155 lb 10 3 oz), SpO2 95 %  ,Body mass index is 25 12 kg/m²  Intake/Output Summary (Last 24 hours) at 6/7/2023 1438  Last data filed at 6/7/2023 0900  Gross per 24 hour   Intake 800 ml   Output 800 ml   Net 0 ml     Current Medications: Reviewed    Physical Exam:   Physical Exam  Vitals and nursing note reviewed  Constitutional:       Appearance: She is not toxic-appearing  HENT:      Head: Normocephalic  Comments: Left periorbital ecchymosis      Nose: Nose normal       Mouth/Throat:      Mouth: Mucous membranes are dry  Eyes:      General: No scleral icterus  Right eye: No discharge  Left eye: No discharge  Conjunctiva/sclera: Conjunctivae normal    Neck:      Comments: Trachea midline  Cardiovascular:      Rate and Rhythm: Normal rate  Pulses: Normal pulses  Pulmonary:      Effort: Pulmonary effort is normal  No respiratory distress  Breath sounds: No wheezing  Abdominal:      Comments: abd feels full and mildly distended    Musculoskeletal:      Cervical back: Neck supple  Comments: Swelling small area dorsum of base of L thumb     RUE in splint    Skin:     General: Skin is warm and dry  Neurological:      Mental Status: She is alert  Comments: Awake and alert, oriented, answers questions appropriately   Psychiatric:      Comments: Flat affect but remains pleasant and cooperative         Invasive Devices     Peripheral Intravenous Line  Duration           Peripheral IV 06/05/23 Left;Ventral (anterior) Forearm 2 days              Lab, Imaging and other studies: I have personally reviewed pertinent reports        "

## 2023-06-08 VITALS
HEART RATE: 72 BPM | OXYGEN SATURATION: 95 % | WEIGHT: 155.65 LBS | DIASTOLIC BLOOD PRESSURE: 66 MMHG | BODY MASS INDEX: 25.01 KG/M2 | SYSTOLIC BLOOD PRESSURE: 120 MMHG | TEMPERATURE: 98.5 F | RESPIRATION RATE: 16 BRPM | HEIGHT: 66 IN

## 2023-06-08 PROBLEM — R50.9 FEVER: Status: RESOLVED | Noted: 2023-06-06 | Resolved: 2023-06-08

## 2023-06-08 PROCEDURE — 99239 HOSP IP/OBS DSCHRG MGMT >30: CPT | Performed by: STUDENT IN AN ORGANIZED HEALTH CARE EDUCATION/TRAINING PROGRAM

## 2023-06-08 PROCEDURE — 97116 GAIT TRAINING THERAPY: CPT

## 2023-06-08 RX ORDER — ACETAMINOPHEN 325 MG/1
975 TABLET ORAL EVERY 6 HOURS SCHEDULED
Refills: 0
Start: 2023-06-08

## 2023-06-08 RX ORDER — LEVETIRACETAM 500 MG/1
500 TABLET ORAL EVERY 12 HOURS SCHEDULED
Qty: 6 TABLET | Refills: 0 | Status: SHIPPED | OUTPATIENT
Start: 2023-06-08 | End: 2023-06-11

## 2023-06-08 RX ORDER — OXYCODONE HYDROCHLORIDE 5 MG/1
5 TABLET ORAL EVERY 6 HOURS PRN
Qty: 10 TABLET | Refills: 0 | Status: SHIPPED | OUTPATIENT
Start: 2023-06-08 | End: 2023-06-11

## 2023-06-08 RX ADMIN — ACETAMINOPHEN 975 MG: 325 TABLET, FILM COATED ORAL at 09:56

## 2023-06-08 RX ADMIN — ACETAMINOPHEN 975 MG: 325 TABLET, FILM COATED ORAL at 04:22

## 2023-06-08 RX ADMIN — AMLODIPINE BESYLATE 5 MG: 5 TABLET ORAL at 08:11

## 2023-06-08 RX ADMIN — GABAPENTIN 300 MG: 300 CAPSULE ORAL at 08:11

## 2023-06-08 RX ADMIN — LEVETIRACETAM 500 MG: 500 TABLET, FILM COATED ORAL at 08:11

## 2023-06-08 RX ADMIN — CHLORHEXIDINE GLUCONATE 15 ML: 1.2 SOLUTION ORAL at 08:11

## 2023-06-08 RX ADMIN — ENOXAPARIN SODIUM 30 MG: 30 INJECTION SUBCUTANEOUS at 08:11

## 2023-06-08 RX ADMIN — SERTRALINE 50 MG: 50 TABLET, FILM COATED ORAL at 08:11

## 2023-06-08 NOTE — DISCHARGE SUMMARY
Discharge Summary - Sabas Diaz 67 y o  female MRN: [id number]    Unit/Bed#: [facility] 610-01 Encounter: [id number]    Admission Date:   Admission Orders (From admission, onward)     Ordered        06/04/23 1313  Inpatient Admission  Once                        Admitting Diagnosis: Subdural hematoma ([address] ) [S06  5XAA]  Triquetral chip fracture, right, closed, initial encounter [S62 111A]  Unspecified multiple injuries, initial encounter [T07  XXXA]    HPI: patient is a 68 yo F fall, SDH and R wrist fracture  Procedures Performed: No orders of the defined types were placed in this encounter  Summary of Hospital Course: Evaluated by neurosurgery and orthopedics, neurosurgery will follow up with 2 week head CT scan, keppra 7 day course continued  Orthopedics placed rue in splint, NWB and follow up as outpatient  Cleared by therapy, tolerating diet  Pain controlled  Significant Findings, Care, Treatment and Services Provided: as above    Complications: none    Discharge Diagnosis: subdural hematoma    Exam on discharge : patient reports usual pain in her r wrist, no headaches or dizziness  No nausea or vomiting  No changes in vision  Tolerating diet  Urinating and having bowel movements  On exam she is awake, alert and oriented  She has GCS 15, pupils equal and reactive  She has no respiratory distress, clear breath sounds bilateral, heart regular rate  Abdomen soft and nontender  RUE splinted wiggles fingers  Sensation intact  L thumb ecchymosis, no snuff box tenderness  Moving hand normally and  intact  L knee ecchymosis, normal ROM  No edema in lower extremities  Medical Problems     Resolved Problems  Date Reviewed: 6/7/2023          Resolved    Fever 6/8/2023     Resolved by  Yesi Mays PA-C          Condition at Discharge: good         Discharge instructions/Information to patient and family:   See after visit summary for information provided to patient and family        Provisions for Follow-Up Care:  See after visit summary for information related to follow-up care and any pertinent home health orders  PCP: No primary care provider on file  Disposition: Home    Planned Readmission: No      Discharge Statement   I spent 40 minutes discharging the patient  This time was spent on the day of discharge  I had direct contact with the patient on the day of discharge  Additional documentation is required if more than 30 minutes were spent on discharge  Discharge Medications:  See after visit summary for reconciled discharge medications provided to patient and family

## 2023-06-08 NOTE — PLAN OF CARE
Problem: MOBILITY - ADULT  Goal: Maintains/Returns to pre admission functional level  Description: INTERVENTIONS:  - Perform BMAT or MOVE assessment daily    - Set and communicate daily mobility goal to care team and patient/family/caregiver     - Collaborate with rehabilitation services on mobility goals if consulted    Problem: INFECTION - ADULT  Goal: Absence of fever/infection during neutropenic period  Description: INTERVENTIONS:  - Monitor WBC    Outcome: Progressing     Problem: SAFETY ADULT  Goal: Patient will remain free of falls  Description: INTERVENTIONS:  - Educate patient/family on patient safety including physical limitations  - Instruct patient to call for assistance with activity   - Consult OT/PT to assist with strengthening/mobility   - Keep Call bell within reach  - Keep bed low and locked with side rails adjusted as appropriate  - Keep care items and personal belongings within reach  - Initiate and maintain comfort rounds  - Make Fall Risk Sign visible to staff    Problem: Potential for Falls  Goal: Patient will remain free of falls  Description: INTERVENTIONS:  - Educate patient/family on patient safety including physical limitations  - Instruct patient to call for assistance with activity   - Consult OT/PT to assist with strengthening/mobility   - Keep Call bell within reach  - Keep bed low and locked with side rails adjusted as appropriate  - Keep care items and personal belongings within reach  - Initiate and maintain comfort rounds  - Make Fall Risk Sign visible to staff    - Consider moving patient to room near nurses station  Outcome: Progressing     - Apply yellow socks and bracelet for high fall risk patients  - Consider moving patient to room near nurses station  Outcome: Progressing     - Out of bed for toileting  - Record patient progress and toleration of activity level   Outcome: Progressing

## 2023-06-08 NOTE — PLAN OF CARE
Problem: PHYSICAL THERAPY ADULT  Goal: Performs mobility at highest level of function for planned discharge setting  See evaluation for individualized goals  Description: Treatment/Interventions: Therapeutic exercise          See flowsheet documentation for full assessment, interventions and recommendations  Outcome: Progressing  Note: Prognosis: Good  Problem List: Decreased strength, Pain, Impaired balance, Impaired judgement, Decreased safety awareness, Decreased skin integrity, Orthopedic restrictions  Assessment: Pt demonstrated improved functional endurance today, ambulating community distances & negotiating steps without need for physical assist   She does occasionally make use of hallway railing due to L knee soreness when prompted, but declined use of SPC when offered  No overt LOB noted, but anticipate pt will likely utilize furniture walking upon d/c if she continues to ambulate in similar manner at home  She negotiated steps as noted above, doing so with use of bilateral rails despite instructions in NWB RUE  She reported some difficulty descending due to L knee pain, but no LOB noted despite the same  Upon return to room, pt toileted herself & reported confidence in ability to perform tasks upon d/c, dismissing this PTA at that time  RN informed of pt status post activity  From mobility standpoint, pt is cleared for home d/c as long as she has sufficient social support to provide requisite supervision to prevent subsequent falls due to demonstrated lack of safety awareness at this time  pt discouraged from walking her dog herself on the leash given the same risks, but pt dismissed these concerns as well    Pt may benefit from cog assessment to determine current baseline mental status prior to d/c   Barriers to Discharge: Decreased caregiver support     PT Discharge Recommendation: No rehabilitation needs (possible outpatient hand therapy if necessary once cleared by ortho)    See flowsheet documentation for full assessment

## 2023-06-08 NOTE — PHYSICAL THERAPY NOTE
Physical Therapy Progress Note     06/08/23 1400   PT Last Visit   PT Visit Date 06/08/23   Note Type   Note Type Treatment   Pain Assessment   Pain Score No Pain   Restrictions/Precautions   RUE Weight Bearing Per Order NWB   Braces or Orthoses Splint  (voler slab splint)   Other Precautions WBS;Pain; Fall Risk;Cognitive   Subjective   Subjective pt encountered supine in bed, pleasant and agreeable to treatment  Reports no new complaints & denies pain  Pt is eager to d/c home & has been independently walking in room per discussion with RN prior to session  Pt reports no changes in status with activity  Bed Mobility   Supine to Sit 5  Supervision   Additional items Assist x 1   Transfers   Sit to Stand 5  Supervision   Additional items Assist x 1   Stand to Sit 5  Supervision   Additional items Assist x 1   Ambulation/Elevation   Gait pattern Short stride; Inconsistent richa;Decreased foot clearance;Decreased L stance;Narrow WANDER; Improper Weight shift; Poor UE support   Gait Assistance 5  Supervision   Additional items Assist x 1   Assistive Device None  (pt declined SPC when offered)   Distance 340' total   Stair Management Assistance 5  Supervision   Additional items Assist x 1   Stair Management Technique Two rails; Step to pattern; Foreward  (noncompliant RUE WBS despite instructions)   Number of Stairs 7   Balance   Static Sitting Good   Static Standing Fair   Ambulatory Fair -   Activity Tolerance   Activity Tolerance Patient tolerated treatment well   Nurse Made Aware Myke Chanel RN   Assessment   Prognosis Good   Problem List Decreased strength;Pain; Impaired balance; Impaired judgement;Decreased safety awareness;Decreased skin integrity;Orthopedic restrictions   Assessment Pt demonstrated improved functional endurance today, ambulating community distances & negotiating steps without need for physical assist   She does occasionally make use of hallway railing due to L knee soreness when prompted, but declined use of SPC when offered  No overt LOB noted, but anticipate pt will likely utilize furniture walking upon d/c if she continues to ambulate in similar manner at home  She negotiated steps as noted above, doing so with use of bilateral rails despite instructions in NWB RUE  She reported some difficulty descending due to L knee pain, but no LOB noted despite the same  Upon return to room, pt toileted herself & reported confidence in ability to perform tasks upon d/c, dismissing this PTA at that time  RN informed of pt status post activity  From mobility standpoint, pt is cleared for home d/c as long as she has sufficient social support to provide requisite supervision to prevent subsequent falls due to demonstrated lack of safety awareness at this time  pt discouraged from walking her dog herself on the leash given the same risks, but pt dismissed these concerns as well  Pt may benefit from cog assessment to determine current baseline mental status prior to d/c  Goals   Patient Goals to go home today   STG Expiration Date 06/19/23   PT Treatment Day 1   Plan   Treatment/Interventions Functional transfer training;LE strengthening/ROM; Elevations; Therapeutic exercise; Endurance training;Patient/family training;Bed mobility; Equipment eval/education;Gait training   Progress Progressing toward goals   PT Frequency 2-3x/wk   Recommendation   PT Discharge Recommendation No rehabilitation needs  (possible outpatient hand therapy if necessary once cleared by ortho)   AM-PAC Basic Mobility Inpatient   Turning in Flat Bed Without Bedrails 4   Lying on Back to Sitting on Edge of Flat Bed Without Bedrails 4   Moving Bed to Chair 4   Standing Up From Chair Using Arms 4   Walk in Room 3   Climb 3-5 Stairs With Railing 3   Basic Mobility Inpatient Raw Score 22   Basic Mobility Standardized Score 47 4   Highest Level Of Mobility   JH-HLM Goal 7: Walk 25 feet or more   JH-HLM Achieved 8: Walk 250 feet ot more       [place] Sania Umaña, PTA    An [facility] Basic Mobility Raw Score less than 17 suggests pt would benefit from post acute rehab  Please also refer to the recommendation of the Physical Therapist for safe discharge planning

## 2023-06-09 ENCOUNTER — TELEPHONE (OUTPATIENT)
Dept: OBGYN CLINIC | Facility: CLINIC | Age: 73
End: 2023-06-09

## 2023-06-09 NOTE — UTILIZATION REVIEW
/  Henrique Aguilar   This is a Notification of Discharge from [address]  Please be advised that this patient has been discharge from our facility  Below you will find the admission and discharge date and time including the patient’s disposition  UTILIZATION REVIEW CONTACT:  Mel Byers  Utilization   Network Utilization Review Department  Phone: [phone] x carefully listen to the prompts  All voicemails are confidential   Email: [email or web address]     ADMISSION INFORMATION  PRESENTATION DATE: 6/4/2023 12:25 PM  OBERVATION ADMISSION DATE:   INPATIENT ADMISSION DATE: 6/4/23  1:13 PM   DISCHARGE DATE: 6/8/2023  6:29 PM   DISPOSITION:Home/Self Care    IMPORTANT INFORMATION:  Send all requests for admission clinical reviews, approved or denied determinations and any other requests to dedicated fax number below belonging to the campus where the patient is receiving treatment   List of dedicated fax numbers:  [address] DENIALS (Administrative/Medical Necessity) [phone]   [address] (Maternity/NICU/Pediatrics) [phone]   [facility] [phone]   [facility] 87 [phone]   [address] [phone]   220 [facility][note 2] [phone] 90 [facility] [phone]   [facility]6 [facility] 119 [phone]   [facility]  [phone]   4057 [facility] [phone]   412 [facility] [address] [facility] [phone]

## 2023-06-09 NOTE — TELEPHONE ENCOUNTER
----- Message from Melo Dorado MD sent at 6/8/2023  8:52 PM EDT -----  Please schedule patient follow-up for left dorsal triquetral fracture  Can been seen in 1-2 weeks with hand team  Thank you

## 2023-06-11 LAB
BACTERIA BLD CULT: NORMAL
BACTERIA BLD CULT: NORMAL

## 2023-06-19 ENCOUNTER — HOSPITAL ENCOUNTER (OUTPATIENT)
Dept: CT IMAGING | Facility: HOSPITAL | Age: 73
Discharge: HOME | End: 2023-06-19
Payer: COMMERCIAL

## 2023-06-19 DIAGNOSIS — S06.5XAA SUBDURAL HEMATOMA (HCC): ICD-10-CM

## 2023-06-19 PROCEDURE — G1004 CDSM NDSC: HCPCS

## 2023-06-19 PROCEDURE — 70450 CT HEAD/BRAIN W/O DYE: CPT

## 2023-06-21 ENCOUNTER — TELEPHONE (OUTPATIENT)
Dept: OTHER | Facility: HOSPITAL | Age: 73
End: 2023-06-21

## 2023-06-21 NOTE — TELEPHONE ENCOUNTER
Attempted to phone patient multiple times after being alerted to results of CT head from 6/19  Briefly, she is s/p mechanical fall while walking her dog on 6/4  She sustained a left frontotemporal subdural hematoma  We scheduled her for 2 week follow up to survey size of bleed and resolution  On follow up CT head completed 6/19 she was noted with slight increase in size of now subacute SDH with mild mass effect and 3 mm left to right shift  Patient is scheduled for follow up appointment tomorrow in clinic  I was hoping to touch base with her to ask about symptoms, etc to see if she should be evaluated sooner  She may be a candidate for MMA embolization vs further surveillance

## 2023-06-21 NOTE — TELEPHONE ENCOUNTER
Attempted to reach Shari x3 before leaving a voicemail requesting a call back  A second message was left after no callback received  The line does not ring and goes directly to voicemail  Based on documentation from CM while IP the patient lives with her granddaughters ex-boyfriend  No additional phone numbers or contact persons listed in chart

## 2023-06-22 ENCOUNTER — TELEPHONE (OUTPATIENT)
Dept: NEUROSURGERY | Facility: CLINIC | Age: 73
End: 2023-06-22

## 2023-06-22 NOTE — TELEPHONE ENCOUNTER
Callback received from Shari  She is well  She denies any changes in her neuro status other than occasional fullness and headache  Her work schedule makes it difficult for her to come to OV during the day  Assisted to reschedule as virtual appt to formally discuss the results of her CT and determine if additional imaging is needed  She was appreciative

## 2023-06-22 NOTE — TELEPHONE ENCOUNTER
"06/22/2023-CALLED PT, LEFT MESSAGE ON MACHINE TO RESCHEDULE TODAY'S \"NO SHOW\" CHRISTIANO MENENDEZ/MERA    "

## 2023-06-22 NOTE — TELEPHONE ENCOUNTER
Made another attempt to reach [facility] to check in with her and ensure she is aware of her OV scheduled today at 10am  Left a detailed message encouraging a call back or present to [address] [place] [address] Rd

## 2023-06-22 NOTE — TELEPHONE ENCOUNTER
Made another attempt to reach patient after patient no showed to her OV today  Call non-emergency police department to do welfare check on patient since we have been unable to move contact with her

## 2023-06-22 NOTE — TELEPHONE ENCOUNTER
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

## 2023-06-22 NOTE — TELEPHONE ENCOUNTER
Received a call from [facility] reporting he went to the patients address to complete welfare check and he was able to make contact with her roommate Alvin Harding who states the patient went to work today  He will attempt to touch base with the patient later today and contact me once again to advise of outcome

## 2023-06-23 NOTE — ASSESSMENT & PLAN NOTE
2 week follow up of a left frontotemporal SDH s/p mechanical fall while walking dog 6/4  · No history of AC/AP  · Ongoing mild HA, intermittent dizziness, stuttering at times  Left eyebrow doesn't raise since fall with bruising  She returned to work last week  · Exam: limited via phone  Alert and oriented to person, place, time, able to name president, perform simple calculations, notes asymmetric eyebrow raise on the left since fall, symmetric smile and tongue protrusion, no dysarthria, denies weakness/sensory changes in extremities  Imaging:  · CT head 6/19/23: There is a now subacute subdural hematoma within the left hemisphere which has slightly increased in size posteriorly superficial to the posterior lateral temporal occipital region and parietal lobe now measuring 6 mm in that region  Heterogeneous, mixed density within this subacute subdural hematoma with mild mass effect upon the left hemisphere and 3 mm of left to right shift  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, stable  Plan:  · Reviewed imaging with patient  There is no new, acute bleeding  It is now becoming subacute  · We will continue to closely monitor  No acute intervention recommended at this time  · She was not on any antiplatelet/anticoagulation prior to admit  · Briefly discussed possible MMA embolization and indications for procedure  · Would like to see patient in person in the office for a neurologic exam with next set of imaging and discussion of possible MMA embolization  · Reviewed red flag signs and symptoms  · Follow-up in 1 week (last CT was 1 week ago) with repeat CT head as joint appointment with Dr Catalina Clarke or Dr Wilver Kinney for possible MMA embolization discussion  · Call sooner with any questions or concerns

## 2023-06-26 ENCOUNTER — TELEMEDICINE (OUTPATIENT)
Dept: NEUROSURGERY | Facility: CLINIC | Age: 73
End: 2023-06-26
Payer: COMMERCIAL

## 2023-06-26 VITALS — HEIGHT: 66 IN | BODY MASS INDEX: 24.91 KG/M2 | WEIGHT: 155 LBS

## 2023-06-26 DIAGNOSIS — S06.5XAA SUBDURAL HEMATOMA (HCC): Primary | ICD-10-CM

## 2023-06-26 PROCEDURE — 99443 PR PHYS/QHP TELEPHONE EVALUATION 21-30 MIN: CPT | Performed by: PHYSICIAN ASSISTANT

## 2023-06-26 RX ORDER — SIMVASTATIN 40 MG
40 TABLET ORAL
COMMUNITY

## 2023-06-26 RX ORDER — CELECOXIB 100 MG/1
100 CAPSULE ORAL 2 TIMES DAILY
COMMUNITY

## 2023-06-26 RX ORDER — VALACYCLOVIR HYDROCHLORIDE 1 G/1
1000 TABLET, FILM COATED ORAL DAILY
COMMUNITY

## 2023-06-26 NOTE — PROGRESS NOTES
Virtual Regular Visit    Verification of patient location:    Patient is located at Home in the following state in which I hold an active license [place]      Assessment/Plan:    Problem List Items Addressed This Visit        Nervous and Auditory    Subdural hematoma ([address] ) - Primary     2 week follow up of a left frontotemporal SDH s/p mechanical fall while walking dog 6/4  · No history of AC/AP  · Ongoing mild HA, intermittent dizziness, stuttering at times  Left eyebrow doesn't raise since fall with bruising  She returned to work last week  · Exam: limited via phone  Alert and oriented to person, place, time, able to name president, perform simple calculations, notes asymmetric eyebrow raise on the left since fall, symmetric smile and tongue protrusion, no dysarthria, denies weakness/sensory changes in extremities  Imaging:  · CT head 6/19/23: There is a now subacute subdural hematoma within the left hemisphere which has slightly increased in size posteriorly superficial to the posterior lateral temporal occipital region and parietal lobe now measuring 6 mm in that region  Heterogeneous, mixed density within this subacute subdural hematoma with mild mass effect upon the left hemisphere and 3 mm of left to right shift  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, stable  Plan:  · Reviewed imaging with patient  There is no new, acute bleeding  It is now becoming subacute  · We will continue to closely monitor  No acute intervention recommended at this time  · She was not on any antiplatelet/anticoagulation prior to admit  · Briefly discussed possible MMA embolization and indications for procedure  · Would like to see patient in person in the office for a neurologic exam with next set of imaging and discussion of possible MMA embolization  · Reviewed red flag signs and symptoms    · Follow-up in 1 week (last CT was 1 week ago) with repeat CT head as joint appointment with Dr Marty Claude or Dr Ainsley Okeefe for possible MMA embolization discussion  · Call sooner with any questions or concerns  Relevant Orders    CT head wo contrast            Reason for visit is   Chief Complaint   Patient presents with   • Virtual Regular Visit     VIRTUAL: [address], 2 week HFU with CT head 6/19/23 [facility]  6/4/23 [facility] ED  6/4/23 - 6/8/23 Hosp admission at [facility] consult 6/5/23 Maria Ines        Encounter provider Aby Pena PA-C    Provider located at [address] [address]  [phone]      Recent Visits  Date Type Provider Dept   06/22/23 Telephone [address] recent visits within past 7 days and meeting all other requirements  Today's Visits  Date Type Provider Dept   06/26/23 Telemedicine Aby Pena PA-C [facility]   Showing today's visits and meeting all other requirements  Future Appointments  No visits were found meeting these conditions  Showing future appointments within next 150 days and meeting all other requirements       The patient was identified by name and date of birth  Mike Whitt was informed that this is a telemedicine visit and that the visit is being conducted through Telephone  My office door was closed  No one else was in the room  She acknowledged consent and understanding of privacy and security of the video platform  The patient has agreed to participate and understands they can discontinue the visit at any time  Patient is aware this is a billable service  Subjective  Mike Whitt is a 67 y o  female   21-year-old female seen for 2-week hospital follow-up of a left SDH after a fall 6/4/23 while walking her dog  Not on any OAC/AP  Since her hospitalization, she states she is doing a little bit better but has not fully bounced back yet    The headache she was getting, she continues to get, though they are not as bad   Only having dizzy spells once or twice per day  She has ongoing stuttering at times  States that since her fall the left eyebrow does not raise like the right side does, not changed since her hospitalization, and per patient she does have significant bruising on the side  No numbness or weakness in her extremities  She has been back to work at the [employer] since last week, and states that she is feeling better of being back at work this week  Past Medical History:   Diagnosis Date   • Fall    • Hyperlipidemia    • Hypertension    • SDH (subdural hematoma) (HCC)    • Seizure (HCC)        Past Surgical History:   Procedure Laterality Date   • APPENDECTOMY     • HYSTERECTOMY         Current Outpatient Medications   Medication Sig Dispense Refill   • acetaminophen (TYLENOL) 325 mg tablet Take 3 tablets (975 mg total) by mouth every 6 (six) hours  0   • amLODIPine (NORVASC) 5 mg tablet Take 5 mg by mouth daily     • celecoxib (CeleBREX) 100 mg capsule Take 100 mg by mouth 2 (two) times a day     • gabapentin (NEURONTIN) 300 mg capsule Take 300 mg by mouth 2 (two) times a day     • sertraline (ZOLOFT) 50 mg tablet Take 50 mg by mouth daily at bedtime     • simvastatin (ZOCOR) 40 mg tablet Take 40 mg by mouth daily at bedtime     • valACYclovir (VALTREX) 1,000 mg tablet Take 1,000 mg by mouth daily       No current facility-administered medications for this visit  Allergies   Allergen Reactions   • Dust Mite Extract Other (See Comments)     And dust mites       Review of Systems   Gastrointestinal: Negative  Genitourinary: Positive for frequency  Neurological: Positive for dizziness (rarely), tremors (b/l hands), speech difficulty (trouble word finding), light-headedness (most of the time) and headaches (daily in the frontal region radiating to occipital region)  Negative for seizures, syncope, weakness and numbness  Hematological: Does not bruise/bleed easily     Psychiatric/Behavioral: "Positive for confusion (memory lapse - STM loss) and sleep disturbance (occasional)  All other systems reviewed and are negative  Video Exam    Vitals:    06/26/23 1431   Weight: 70 3 kg (155 lb)   Height: 5' 6\" (1 676 m)       Physical Exam  Pulmonary:      Effort: Pulmonary effort is normal    Neurological:      Mental Status: She is alert and oriented to person, place, and time  Comments: Able to name president  Perform simple calculations  States that left eyebrow does not raise since her fall with significant bruising  No dysarthria  Symmetric smile and tongue protrusion  Denies weakness in extremities  No sensory changes   Psychiatric:         Mood and Affect: Mood normal          Behavior: Behavior normal          Thought Content:  Thought content normal          Judgment: Judgment normal           Visit Time  Total Visit Duration: 25      "

## 2023-07-04 ENCOUNTER — OFFICE VISIT (OUTPATIENT)
Dept: URGENT CARE | Facility: CLINIC | Age: 73
End: 2023-07-04
Payer: COMMERCIAL

## 2023-07-04 VITALS
OXYGEN SATURATION: 97 % | TEMPERATURE: 100.3 F | SYSTOLIC BLOOD PRESSURE: 142 MMHG | HEART RATE: 79 BPM | WEIGHT: 155 LBS | BODY MASS INDEX: 24.91 KG/M2 | HEIGHT: 66 IN | DIASTOLIC BLOOD PRESSURE: 72 MMHG | RESPIRATION RATE: 18 BRPM

## 2023-07-04 DIAGNOSIS — J02.9 SORE THROAT: Primary | ICD-10-CM

## 2023-07-04 DIAGNOSIS — R05.1 ACUTE COUGH: ICD-10-CM

## 2023-07-04 LAB — S PYO AG THROAT QL: NEGATIVE

## 2023-07-04 PROCEDURE — 87880 STREP A ASSAY W/OPTIC: CPT | Performed by: PHYSICIAN ASSISTANT

## 2023-07-04 PROCEDURE — 99213 OFFICE O/P EST LOW 20 MIN: CPT | Performed by: PHYSICIAN ASSISTANT

## 2023-07-04 PROCEDURE — 87070 CULTURE OTHR SPECIMN AEROBIC: CPT | Performed by: PHYSICIAN ASSISTANT

## 2023-07-04 PROCEDURE — G0463 HOSPITAL OUTPT CLINIC VISIT: HCPCS | Performed by: PHYSICIAN ASSISTANT

## 2023-07-04 RX ORDER — AZITHROMYCIN 250 MG/1
TABLET, FILM COATED ORAL
Qty: 6 TABLET | Refills: 0 | Status: SHIPPED | OUTPATIENT
Start: 2023-07-04 | End: 2023-07-08

## 2023-07-04 RX ORDER — ALBUTEROL SULFATE 90 UG/1
2 AEROSOL, METERED RESPIRATORY (INHALATION) EVERY 6 HOURS PRN
Qty: 8.5 G | Refills: 0 | Status: SHIPPED | OUTPATIENT
Start: 2023-07-04

## 2023-07-04 NOTE — PROGRESS NOTES
[facility]        NAME: Smiley Noguera is a 67 y.o. female  : 1950    MRN: [id number]  DATE: 2023  TIME: 2:48 PM    Assessment and Plan   Sore throat [J02.9]  1. Sore throat  POCT rapid strepA    Throat culture    Ambulatory Referral to [facility]      2. Acute cough  azithromycin (ZITHROMAX) 250 mg tablet    albuterol (ProAir HFA) 90 mcg/act inhaler    Ambulatory Referral to Family Practice          Rapid Strep- negative will send for culture. Patient was told any increase in headache or symptoms go to ED  Patient Instructions       Patient was educated on acute cough. Patient was educated on antibiotics and inhaler. Patient was told to eat on antibiotics. Patient was told to take OTC Tylenol for fever. Chief Complaint     Chief Complaint   Patient presents with   • Sore Throat     Pt presents with sore throat and fever x 3 weeks. Pt also states runny nose and cough. History of Present Illness       Patient is here today complaining of cough, sore throat and low grade fever for three weeks. Patient reports she was in the hospital 3 weeks ago for head trauma. Denies any allergies to medications. Denies any history of asthma or diabetes. Review of Systems   Review of Systems   Constitutional: Positive for fever. HENT: Positive for sore throat. Respiratory: Positive for cough. Cardiovascular: Negative. Neurological: Negative. Psychiatric/Behavioral: Negative.           Current Medications       Current Outpatient Medications:   •  acetaminophen (TYLENOL) 325 mg tablet, Take 3 tablets (975 mg total) by mouth every 6 (six) hours, Disp: , Rfl: 0  •  albuterol (ProAir HFA) 90 mcg/act inhaler, Inhale 2 puffs every 6 (six) hours as needed for wheezing, Disp: 8.5 g, Rfl: 0  •  amLODIPine (NORVASC) 5 mg tablet, Take 5 mg by mouth daily, Disp: , Rfl:   •  azithromycin (ZITHROMAX) 250 mg tablet, Take 2 tablets today then 1 tablet daily x 4 days, Disp: 6 tablet, Rfl: 0  •  celecoxib (CeleBREX) 100 mg capsule, Take 100 mg by mouth 2 (two) times a day, Disp: , Rfl:   •  gabapentin (NEURONTIN) 300 mg capsule, Take 300 mg by mouth 2 (two) times a day, Disp: , Rfl:   •  sertraline (ZOLOFT) 50 mg tablet, Take 50 mg by mouth daily at bedtime, Disp: , Rfl:   •  simvastatin (ZOCOR) 40 mg tablet, Take 40 mg by mouth daily at bedtime, Disp: , Rfl:   •  valACYclovir (VALTREX) 1,000 mg tablet, Take 1,000 mg by mouth daily, Disp: , Rfl:     Current Allergies     Allergies as of 07/04/2023 - Reviewed 07/04/2023   Allergen Reaction Noted   • Dust mite extract Other (See Comments) 01/09/2013            The following portions of the patient's history were reviewed and updated as appropriate: allergies, current medications, past family history, past medical history, past social history, past surgical history and problem list.     Past Medical History:   Diagnosis Date   • Fall    • Hyperlipidemia    • Hypertension    • SDH (subdural hematoma) ([address])    • Seizure ([address])        Past Surgical History:   Procedure Laterality Date   • APPENDECTOMY     • HYSTERECTOMY         Family History   Problem Relation Age of Onset   • Heart disease Mother          Medications have been verified. Objective   /72   Pulse 79   Temp 100.3 °F (37.9 °C)   Resp 18   Ht 5' 6" (1.676 m)   Wt 70.3 kg (155 lb)   SpO2 97%   BMI 25.02 kg/m²   No LMP recorded. Physical Exam     Physical Exam  Vitals and nursing note reviewed. Constitutional:       Appearance: Normal appearance. HENT:      Head: Normocephalic. Comments: Pain over right and left orbit. Right Ear: Tympanic membrane, ear canal and external ear normal.      Left Ear: Tympanic membrane, ear canal and external ear normal.      Mouth/Throat:      Mouth: Mucous membranes are moist.      Pharynx: Posterior oropharyngeal erythema present.       Comments: PND  Eyes:      Pupils: Pupils are equal, round, and reactive to light. Cardiovascular:      Rate and Rhythm: Normal rate and regular rhythm. Heart sounds: Normal heart sounds. Pulmonary:      Breath sounds: Normal breath sounds. No wheezing. Neurological:      General: No focal deficit present. Mental Status: She is alert and oriented to person, place, and time.    Psychiatric:         Mood and Affect: Mood normal.         Behavior: Behavior normal.

## 2023-07-04 NOTE — PATIENT INSTRUCTIONS
Patient was educated on acute cough. Patient was educated on antibiotics and inhaler. Patient was told to eat on antibiotics. Patient was told to take OTC Tylenol for fever. Acute Cough   WHAT YOU NEED TO KNOW:   An acute cough can last up to 3 weeks. Common causes of an acute cough include a cold, allergies, or a lung infection. DISCHARGE INSTRUCTIONS:   Return to the emergency department if:   You have trouble breathing or feel short of breath. You cough up blood, or you see blood in your mucus. You faint or feel weak or dizzy. You have chest pain when you cough or take a deep breath. You have new wheezing. Contact your healthcare provider if:   You have a fever. Your cough lasts longer than 4 weeks. Your symptoms do not improve with treatment. You have questions or concerns about your condition or care. Medicines:   Medicines  may be needed to stop the cough, decrease swelling in your airways, or help open your airways. Medicine may also be given to help you cough up mucus. Ask your healthcare provider what over-the-counter medicines you can take. If you have an infection caused by bacteria, you may need antibiotics. Take your medicine as directed. Contact your healthcare provider if you think your medicine is not helping or if you have side effects. Tell your provider if you are allergic to any medicine. Keep a list of the medicines, vitamins, and herbs you take. Include the amounts, and when and why you take them. Bring the list or the pill bottles to follow-up visits. Carry your medicine list with you in case of an emergency. Manage your symptoms:   Do not smoke and stay away from others who smoke. Nicotine and other chemicals in cigarettes and cigars can cause lung damage and make your cough worse. Ask your healthcare provider for information if you currently smoke and need help to quit. E-cigarettes or smokeless tobacco still contain nicotine.  Talk to your healthcare provider before you use these products. Drink extra liquids as directed. Liquids will help thin and loosen mucus so you can cough it up. Liquids will also help prevent dehydration. Examples of good liquids to drink include water, fruit juice, and broth. Do not drink liquids that contain caffeine. Caffeine can increase your risk for dehydration. Ask your healthcare provider how much liquid to drink each day. Rest as directed. Do not do activities that make your cough worse, such as exercise. Use a humidifier or vaporizer. Use a cool mist humidifier or a vaporizer to increase air moisture in your home. This may make it easier for you to breathe and help decrease your cough. Eat 2 to 5 mL of honey 2 times each day. Honey can help thin mucus and decrease your cough. Use cough drops or lozenges. These can help decrease throat irritation and your cough. Follow up with your healthcare provider as directed:  Write down your questions so you remember to ask them during your visits. © Copyright Loiza Shelton 2022 Information is for End User's use only and may not be sold, redistributed or otherwise used for commercial purposes. The above information is an  only. It is not intended as medical advice for individual conditions or treatments. Talk to your doctor, nurse or pharmacist before following any medical regimen to see if it is safe and effective for you.

## 2023-07-06 LAB — BACTERIA THROAT CULT: NORMAL

## 2023-07-07 ENCOUNTER — TELEPHONE (OUTPATIENT)
Dept: NEUROSURGERY | Facility: CLINIC | Age: 73
End: 2023-07-07

## 2023-07-07 ENCOUNTER — HOSPITAL ENCOUNTER (OUTPATIENT)
Dept: CT IMAGING | Facility: HOSPITAL | Age: 73
Discharge: HOME | End: 2023-07-07
Payer: COMMERCIAL

## 2023-07-07 DIAGNOSIS — S06.5XAA SUBDURAL HEMATOMA (HCC): ICD-10-CM

## 2023-07-07 PROCEDURE — G1004 CDSM NDSC: HCPCS

## 2023-07-07 PROCEDURE — 70450 CT HEAD/BRAIN W/O DYE: CPT

## 2023-07-07 NOTE — TELEPHONE ENCOUNTER
7/7/23 basil asked me to call pt she doesn't have to come in office Monday she can do virtual - I called pt x2 got her voice mail left message awaiting call back _BA

## 2023-07-07 NOTE — ASSESSMENT & PLAN NOTE
5 week follow up of a left frontotemporal SDH s/p mechanical fall while walking dog 6/4. · No history of AC/AP. · Mild residual intermittent headache. Left eyebrow doesn't raise since fall. · Exam: Asymmetric eyebrow raise on the left, otherwise nonfocal.    Imaging:  · CT head 7/7/23: 1. Resolved left hemispheric subdural hematoma. 2. Moderate, chronic microangiopathy. 3. No acute intracranial hemorrhage. Plan:  · Reviewed imaging with patient. Resolution of previous left SDH. · No neurosurgical intervention recommended or follow-up imaging required. · Patient would like a referral to establish with a PCP. Referral placed. · Reviewed red flag signs and symptoms. · Follow-up as needed. Call with any questions or concerns.

## 2023-07-10 ENCOUNTER — OFFICE VISIT (OUTPATIENT)
Dept: NEUROSURGERY | Facility: CLINIC | Age: 73
End: 2023-07-10
Payer: COMMERCIAL

## 2023-07-10 VITALS
DIASTOLIC BLOOD PRESSURE: 77 MMHG | TEMPERATURE: 97.6 F | WEIGHT: 155 LBS | RESPIRATION RATE: 16 BRPM | SYSTOLIC BLOOD PRESSURE: 135 MMHG | BODY MASS INDEX: 24.91 KG/M2 | HEIGHT: 66 IN | HEART RATE: 65 BPM

## 2023-07-10 DIAGNOSIS — S06.5XAA SUBDURAL HEMATOMA (HCC): ICD-10-CM

## 2023-07-10 DIAGNOSIS — S62.111A TRIQUETRAL CHIP FRACTURE, RIGHT, CLOSED, INITIAL ENCOUNTER: Primary | ICD-10-CM

## 2023-07-10 PROCEDURE — 99213 OFFICE O/P EST LOW 20 MIN: CPT | Performed by: PHYSICIAN ASSISTANT

## 2023-07-10 NOTE — PROGRESS NOTES
Neurosurgery Office Note  Slime Darling 67 y.o. female MRN: [id number]      Assessment/Plan     Subdural hematoma ([address])  5 week follow up of a left frontotemporal SDH s/p mechanical fall while walking dog 6/4. · No history of AC/AP. · Mild residual intermittent headache. Left eyebrow doesn't raise since fall. · Exam: Asymmetric eyebrow raise on the left, otherwise nonfocal.    Imaging:  · CT head 7/7/23: 1. Resolved left hemispheric subdural hematoma. 2. Moderate, chronic microangiopathy. 3. No acute intracranial hemorrhage. Plan:  · Reviewed imaging with patient. Resolution of previous left SDH. · No neurosurgical intervention recommended or follow-up imaging required. · Patient would like a referral to establish with a PCP. Referral placed. · Reviewed red flag signs and symptoms. · Follow-up as needed. Call with any questions or concerns. Diagnoses and all orders for this visit:    Triquetral chip fracture, right, closed, initial encounter    Subdural hematoma ([address])  -     Ambulatory referral to [facility]; Future          I have spent a total time of 25 minutes on 07/10/23 in caring for this patient including Diagnostic results, Impressions, Counseling / Coordination of care, Documenting in the medical record, Reviewing / ordering tests, medicine, procedures   and Obtaining or reviewing history  . CHIEF COMPLAINT    Chief Complaint   Patient presents with   • Follow-up     F/U after CT Head 7/7/23       HISTORY    History of Present Illness     67y.o. year old female     50-year-old female seen for 5-week hospital follow-up of a left SDH after a fall 6/4/23 while walking her dog. Not on any OAC/AP. States she is doing a lot better than she was. She has occasional slight headaches when she is stressed.   Since her fall, she is having issues with walking due to left hip and knee pain and the left eyebrow does not raise like the right side does, not changed since her hospitalization. States this is where she hit when she fell. She has been back to work at the [employer]. See Discussion    REVIEW OF SYSTEMS    Review of Systems   Constitutional: Negative. HENT: Negative. Eyes: Negative. Respiratory: Negative. Cardiovascular: Negative. Gastrointestinal: Negative. Endocrine: Negative. Genitourinary: Positive for frequency. Musculoskeletal: Negative. Skin: Negative. Allergic/Immunologic: Positive for environmental allergies. Neurological: Positive for tremors (b/l hands) and speech difficulty (trouble word finding). Negative for dizziness (improving since last visit, rarely), seizures, syncope, weakness, light-headedness (improving since last visit, most of the time), numbness and headaches (improving since last visit, now headaches are every once in a while, stated at last visit - daily in the frontal region radiating to occipital region). Hematological: Negative. Psychiatric/Behavioral: Positive for confusion (unchanged since last visit, memory lapse - STM loss) and sleep disturbance (occasional). ROS obtained by MA. Reviewed. See HPI.      Meds/Allergies     Current Outpatient Medications   Medication Sig Dispense Refill   • acetaminophen (TYLENOL) 325 mg tablet Take 3 tablets (975 mg total) by mouth every 6 (six) hours  0   • albuterol (ProAir HFA) 90 mcg/act inhaler Inhale 2 puffs every 6 (six) hours as needed for wheezing 8.5 g 0   • amLODIPine (NORVASC) 5 mg tablet Take 5 mg by mouth daily     • celecoxib (CeleBREX) 100 mg capsule Take 100 mg by mouth 2 (two) times a day     • gabapentin (NEURONTIN) 300 mg capsule Take 300 mg by mouth 2 (two) times a day     • simvastatin (ZOCOR) 40 mg tablet Take 40 mg by mouth daily at bedtime     • valACYclovir (VALTREX) 1,000 mg tablet Take 1,000 mg by mouth daily     • sertraline (ZOLOFT) 50 mg tablet Take 50 mg by mouth daily at bedtime (Patient not taking: Reported on 7/10/2023)       No current facility-administered medications for this visit. Allergies   Allergen Reactions   • Dust Mite Extract Other (See Comments)     And dust mites       PAST HISTORY    Past Medical History:   Diagnosis Date   • Fall    • Hyperlipidemia    • Hypertension    • SDH (subdural hematoma) (HCC)    • Seizure (HCC)        Past Surgical History:   Procedure Laterality Date   • APPENDECTOMY     • HYSTERECTOMY         Social History     Tobacco Use   • Smoking status: Never   • Smokeless tobacco: Never   Vaping Use   • Vaping Use: Never used   Substance Use Topics   • Alcohol use: Yes     Comment: social drinker   • Drug use: Not Currently       Family History   Problem Relation Age of Onset   • Heart disease Mother          Above history personally reviewed. EXAM    Vitals:Blood pressure 135/77, pulse 65, temperature 97.6 °F (36.4 °C), resp. rate 16, height 5' 6" (1.676 m), weight 70.3 kg (155 lb). ,Body mass index is 25.02 kg/m². Physical Exam  Vitals reviewed. Constitutional:       General: She is awake. Appearance: Normal appearance. HENT:      Head: Normocephalic and atraumatic. Eyes:      Extraocular Movements: EOM normal.      Conjunctiva/sclera: Conjunctivae normal.      Pupils: Pupils are equal, round, and reactive to light. Cardiovascular:      Rate and Rhythm: Normal rate. Pulmonary:      Effort: Pulmonary effort is normal.   Skin:     General: Skin is warm and dry. Neurological:      Mental Status: She is alert and oriented to person, place, and time. Coordination: Finger-Nose-Finger Test normal.      Gait: Gait is intact. Deep Tendon Reflexes:      Reflex Scores:       Bicep reflexes are 2+ on the right side and 2+ on the left side. Brachioradialis reflexes are 2+ on the right side and 2+ on the left side. Patellar reflexes are 2+ on the right side and 2+ on the left side.        Achilles reflexes are 2+ on the right side and 2+ on the left side.  Psychiatric:         Attention and Perception: Attention and perception normal.         Mood and Affect: Mood and affect normal.         Speech: Speech normal.         Behavior: Behavior normal. Behavior is cooperative. Thought Content: Thought content normal.         Cognition and Memory: Cognition and memory normal.         Judgment: Judgment normal.         Neurologic Exam     Mental Status   Oriented to person, place, and time. Follows 2 step commands. Attention: normal. Concentration: normal.   Speech: speech is normal   Level of consciousness: alert  Knowledge: good. Able to perform simple calculations. Able to name object. Able to repeat. Normal comprehension. Cranial Nerves     CN III, IV, VI   Pupils are equal, round, and reactive to light. Extraocular motions are normal.   Right pupil: Shape: regular. Reactivity: brisk. Consensual response: intact. Left pupil: Shape: regular. Reactivity: brisk. Consensual response: intact. CN III: no CN III palsy  CN VI: no CN VI palsy  Nystagmus: none   Ophthalmoparesis: none  Upgaze: normal  Downgaze: normal  Conjugate gaze: present    CN V   Facial sensation intact. CN VII   Right facial weakness: none  Left facial weakness: unable to completely raise left eyebrow.     CN VIII   Hearing: intact    CN XI   Right trapezius strength: normal  Left trapezius strength: normal    CN XII   CN XII normal.     Motor Exam   Muscle bulk: normal  Overall muscle tone: normal  Right arm pronator drift: absent  Left arm pronator drift: absent  BUE - 5/5  RLE - 5/5  LLE - HF/KF 4/5 due to knee/hip pain, KE/DF/PF 5/5     Sensory Exam   Light touch normal.     Gait, Coordination, and Reflexes     Gait  Gait: normal    Coordination   Finger to nose coordination: normal    Tremor   Resting tremor: absent  Intention tremor: absent  Action tremor: absent    Reflexes   Right brachioradialis: 2+  Left brachioradialis: 2+  Right biceps: 2+  Left biceps: 2+  Right patellar: 2+  Left patellar: 2+  Right achilles: 2+  Left achilles: 2+  Right Conklin: absent  Left Conklin: absent  Right ankle clonus: absent  Left ankle clonus: absent        MEDICAL DECISION MAKING    Imaging Studies:     CT head wo contrast    Result Date: 7/7/2023  Narrative: CT BRAIN - WITHOUT CONTRAST INDICATION:   S06. 5XAA: Traumatic subdural hemorrhage with loss of consciousness status unknown, initial encounter. COMPARISON: 6/19/2023 TECHNIQUE:  CT examination of the brain was performed. Multiplanar 2D reformatted images were created from the source data. Radiation dose length product (DLP) for this visit:  881 mGy-cm . This examination, like all CT scans performed in the [facility], was performed utilizing techniques to minimize radiation dose exposure, including the use of iterative reconstruction and automated exposure control. IMAGE QUALITY:  Diagnostic. FINDINGS: PARENCHYMA: Decreased attenuation is noted in periventricular and subcortical white matter demonstrating an appearance that is statistically most likely to represent moderate microangiopathic change. No CT signs of acute infarction. No intracranial mass, mass effect or midline shift. No acute parenchymal hemorrhage. VENTRICLES AND EXTRA-AXIAL SPACES: Previously noted left hemispheric subdural hematoma has resolved. No acute intracranial hemorrhage. No hydrocephalus VISUALIZED ORBITS: Normal visualized orbits. PARANASAL SINUSES: Normal visualized paranasal sinuses. CALVARIUM AND EXTRACRANIAL SOFT TISSUES:  Normal.     Impression: 1. Resolved left hemispheric subdural hematoma. 2. Moderate, chronic microangiopathy. 3. No acute intracranial hemorrhage. Workstation performed: [id number]     CT head wo contrast    Result Date: 6/21/2023  Narrative: CT BRAIN - WITHOUT CONTRAST INDICATION:   S06. 5XAA: Traumatic subdural hemorrhage with loss of consciousness status unknown, initial encounter.  COMPARISON: 6/5/2023 TECHNIQUE:  CT examination of the brain was performed. Multiplanar 2D reformatted images were created from the source data. Radiation dose length product (DLP) for this visit:  889.76 mGy-cm . This examination, like all CT scans performed in the [facility], was performed utilizing techniques to minimize radiation dose exposure, including the use of iterative  reconstruction and automated exposure control. IMAGE QUALITY:  Diagnostic. FINDINGS: PARENCHYMA: The previously identified acute subdural hematoma within the left hemisphere demonstrates decreasing attenuation, now subacute with mixed increased density and intermediate density. The collection has become larger posteriorly superficial to the parietal and posterior lateral temporal occipital lobes and is unchanged in size superficial to the frontal lobe and anterior temporal lobe. Mild mass effect upon the left hemisphere with approximately 3 mm of left to right shift, similar to the prior examination. This measures up to approximately 1 cm anteriorly and approximately 6 mm posteriorly. Stable moderate to advanced chronic microangiopathic change throughout the cerebral hemispheres. The basilar cisterns are patent. VENTRICLES: No intraventricular hemorrhage. No obstructive hydrocephalus. VISUALIZED ORBITS: Normal visualized orbits. PARANASAL SINUSES: Normal visualized paranasal sinuses. CALVARIUM AND EXTRACRANIAL SOFT TISSUES: No acute osseous abnormality. There is a small, resolving extracranial soft tissue contusion superficial to the left supraorbital frontal bone. Impression: There is a now subacute subdural hematoma within the left hemisphere which has slightly increased in size posteriorly superficial to the posterior lateral temporal occipital region and parietal lobe now measuring 6 mm in that region. Heterogeneous, mixed  density within this subacute subdural hematoma with mild mass effect upon the left hemisphere and 3 mm of left to right shift. 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, stable. This examination was marked "immediate notification" in Epic in order to begin the standard process by which the radiology reading room liaison alerts the referring practitioner. Workstation performed: [id number]       I have personally reviewed pertinent reports.    and I have personally reviewed pertinent films in PACS

## 2023-07-12 ENCOUNTER — TELEPHONE (OUTPATIENT)
Dept: URGENT CARE | Facility: CLINIC | Age: 73
End: 2023-07-12

## 2023-09-02 ENCOUNTER — OFFICE VISIT (OUTPATIENT)
Dept: URGENT CARE | Facility: CLINIC | Age: 73
End: 2023-09-02
Payer: COMMERCIAL

## 2023-09-02 VITALS
OXYGEN SATURATION: 97 % | TEMPERATURE: 101 F | DIASTOLIC BLOOD PRESSURE: 72 MMHG | SYSTOLIC BLOOD PRESSURE: 132 MMHG | RESPIRATION RATE: 18 BRPM | HEART RATE: 89 BPM

## 2023-09-02 DIAGNOSIS — B34.9 VIRAL INFECTION: Primary | ICD-10-CM

## 2023-09-02 LAB
S PYO AG THROAT QL: NEGATIVE
SARS-COV-2 AG UPPER RESP QL IA: NEGATIVE
VALID CONTROL: NORMAL

## 2023-09-02 PROCEDURE — 87636 SARSCOV2 & INF A&B AMP PRB: CPT

## 2023-09-02 PROCEDURE — G0463 HOSPITAL OUTPT CLINIC VISIT: HCPCS

## 2023-09-02 PROCEDURE — 99213 OFFICE O/P EST LOW 20 MIN: CPT

## 2023-09-02 PROCEDURE — 87811 SARS-COV-2 COVID19 W/OPTIC: CPT

## 2023-09-02 PROCEDURE — 87880 STREP A ASSAY W/OPTIC: CPT

## 2023-09-02 NOTE — PROGRESS NOTES
[facility]        NAME: Salud Luis is a 67 y.o. female  : 1950    MRN: [id number]  DATE: 2023  TIME: 1:56 PM    Assessment and Plan   Viral infection [B34.9]  1. Viral infection  POCT rapid strepA    Poct Covid 19 Rapid Antigen Test    Covid/Flu-Office Collect        - Rapid strep negative  - Rapid COVID negative  - Pending COVID/Flu send out     Patient Instructions       Follow up with PCP in 3-5 days. Proceed to  ER if symptoms worsen. Chief Complaint     Chief Complaint   Patient presents with   • Generalized Body Aches     Pt reports body aches x3 days. • Headache     Pt reports HA today. • Sore Throat     Pt reports sore throat x3 days. History of Present Illness       68 y/o F presents for cold like symptoms x 4 days. Admits to sore throat, body aches, and HA. No OTC meds. Works as . No known sick contacts. Review of Systems   Review of Systems   Constitutional: Positive for fever. Negative for chills. HENT: Positive for sore throat. Negative for congestion, ear pain and rhinorrhea. Respiratory: Negative for cough.           Current Medications       Current Outpatient Medications:   •  acetaminophen (TYLENOL) 325 mg tablet, Take 3 tablets (975 mg total) by mouth every 6 (six) hours, Disp: , Rfl: 0  •  albuterol (ProAir HFA) 90 mcg/act inhaler, Inhale 2 puffs every 6 (six) hours as needed for wheezing, Disp: 8.5 g, Rfl: 0  •  amLODIPine (NORVASC) 5 mg tablet, Take 5 mg by mouth daily, Disp: , Rfl:   •  gabapentin (NEURONTIN) 300 mg capsule, Take 300 mg by mouth 2 (two) times a day, Disp: , Rfl:   •  simvastatin (ZOCOR) 40 mg tablet, Take 40 mg by mouth daily at bedtime, Disp: , Rfl:   •  valACYclovir (VALTREX) 1,000 mg tablet, Take 1,000 mg by mouth daily, Disp: , Rfl:   •  celecoxib (CeleBREX) 100 mg capsule, Take 100 mg by mouth 2 (two) times a day (Patient not taking: Reported on 2023), Disp: , Rfl:   •  sertraline (ZOLOFT) 50 mg tablet, Take 50 mg by mouth daily at bedtime (Patient not taking: Reported on 7/10/2023), Disp: , Rfl:     Current Allergies     Allergies as of 09/02/2023 - Reviewed 09/02/2023   Allergen Reaction Noted   • Dust mite extract Other (See Comments) 01/09/2013            The following portions of the patient's history were reviewed and updated as appropriate: allergies, current medications, past family history, past medical history, past social history, past surgical history and problem list.     Past Medical History:   Diagnosis Date   • Fall    • Hyperlipidemia    • Hypertension    • SDH (subdural hematoma) ([address])    • Seizure ([address])        Past Surgical History:   Procedure Laterality Date   • APPENDECTOMY     • HYSTERECTOMY         Family History   Problem Relation Age of Onset   • Heart disease Mother          Medications have been verified. Objective   /72   Pulse 89   Temp (!) 101 °F (38.3 °C) (Tympanic)   Resp 18   SpO2 97%   No LMP recorded. Patient is postmenopausal.       Physical Exam     Physical Exam  Vitals and nursing note reviewed. Constitutional:       General: She is not in acute distress. Appearance: She is not toxic-appearing. HENT:      Head: Normocephalic and atraumatic. Right Ear: Tympanic membrane and ear canal normal.      Left Ear: Tympanic membrane and ear canal normal.      Nose: No congestion or rhinorrhea. Mouth/Throat:      Mouth: Mucous membranes are moist.      Pharynx: Uvula midline. No oropharyngeal exudate or posterior oropharyngeal erythema. Tonsils: No tonsillar exudate. Eyes:      Conjunctiva/sclera: Conjunctivae normal.   Pulmonary:      Effort: Pulmonary effort is normal.      Breath sounds: Normal breath sounds. Lymphadenopathy:      Cervical: No cervical adenopathy. Neurological:      Mental Status: She is alert.

## 2023-09-03 LAB
FLUAV RNA RESP QL NAA+PROBE: NEGATIVE
FLUBV RNA RESP QL NAA+PROBE: NEGATIVE
SARS-COV-2 RNA RESP QL NAA+PROBE: NEGATIVE

## 2024-11-11 NOTE — CASE MANAGEMENT
Case Management Discharge Planning Note    Patient name Vicki Montemayor  Location [facility] 610/PPHP [id number] MRN [id number]  : 1950 Date 2023       Current Admission Date: 2023  Current Admission Diagnosis:Subdural hematoma [facility])   Patient Active Problem List    Diagnosis Date Noted   • Fever 2023   • Fall 2023   • Subdural hematoma ([facility] [address] ) 2023   • Hyperlipidemia 2023   • Hypertension 2023   • Mood disorder ([facility] [address] ) 2023   • Traumatic hematoma of forehead 2023   • Triquetral chip fracture, right, closed, initial encounter 2023      LOS (days): 4  Geometric Mean LOS (GMLOS) (days): 4 60  Days to GMLOS:0 8     OBJECTIVE:  Risk of Unplanned Readmission Score: 7 48         Current admission status: Inpatient   Preferred Pharmacy:   [facility] [address] Ornelas  [place] [address], [place] - [address]  [address]  [facility] [address]  Phone: [phone] Fax: [phone]    Primary Care Provider: No primary care provider on file  Primary Insurance: [employer] [facility] REP  Secondary Insurance:     DISCHARGE DETAILS:    CM met with pt to discuss d/c planning  Plan remains for a home d/c when medically stable  Pt has transportation home  normal appearance, no deformities, trachea midline, thyroid nontender